# Patient Record
Sex: FEMALE | Race: WHITE | NOT HISPANIC OR LATINO | Employment: OTHER | URBAN - METROPOLITAN AREA
[De-identification: names, ages, dates, MRNs, and addresses within clinical notes are randomized per-mention and may not be internally consistent; named-entity substitution may affect disease eponyms.]

---

## 2017-08-04 ENCOUNTER — OFFICE VISIT (OUTPATIENT)
Dept: OBSTETRICS AND GYNECOLOGY | Facility: CLINIC | Age: 77
End: 2017-08-04

## 2017-08-04 VITALS
DIASTOLIC BLOOD PRESSURE: 80 MMHG | OXYGEN SATURATION: 96 % | HEIGHT: 62 IN | SYSTOLIC BLOOD PRESSURE: 124 MMHG | BODY MASS INDEX: 28.89 KG/M2 | HEART RATE: 72 BPM | WEIGHT: 157 LBS

## 2017-08-04 DIAGNOSIS — L98.9 SKIN LESION: ICD-10-CM

## 2017-08-04 DIAGNOSIS — L98.9 SKIN LESION: Primary | ICD-10-CM

## 2017-08-04 PROCEDURE — 56605 BIOPSY OF VULVA/PERINEUM: CPT | Performed by: NURSE PRACTITIONER

## 2017-08-04 PROCEDURE — 99203 OFFICE O/P NEW LOW 30 MIN: CPT | Performed by: NURSE PRACTITIONER

## 2017-08-04 RX ORDER — LEVOTHYROXINE SODIUM 0.05 MG/1
50 TABLET ORAL DAILY
COMMUNITY
Start: 2017-07-21 | End: 2022-05-17 | Stop reason: SDUPTHER

## 2017-08-04 RX ORDER — PRAVASTATIN SODIUM 20 MG
20 TABLET ORAL NIGHTLY
COMMUNITY
Start: 2017-07-21 | End: 2022-05-17 | Stop reason: SDUPTHER

## 2017-08-04 RX ORDER — ZOSTER VACCINE LIVE 19400 [PFU]/.65ML
INJECTION, POWDER, LYOPHILIZED, FOR SUSPENSION SUBCUTANEOUS
COMMUNITY
Start: 2017-07-12 | End: 2021-09-27

## 2017-08-04 RX ORDER — OMEPRAZOLE 20 MG/1
20 CAPSULE, DELAYED RELEASE ORAL DAILY
COMMUNITY
Start: 2017-06-16 | End: 2022-04-05 | Stop reason: SDUPTHER

## 2017-08-04 RX ORDER — AMLODIPINE BESYLATE 2.5 MG/1
TABLET ORAL
COMMUNITY
Start: 2017-07-21 | End: 2021-09-27 | Stop reason: DRUGHIGH

## 2017-08-07 ENCOUNTER — TELEPHONE (OUTPATIENT)
Dept: GYNECOLOGIC ONCOLOGY | Facility: CLINIC | Age: 77
End: 2017-08-07

## 2017-08-07 NOTE — TELEPHONE ENCOUNTER
Called patient to notify pathology from vulvar biopsy revealed benign hemangioma. She is thankful for this news and will notify us of any concerns in the future.

## 2017-08-08 NOTE — PROGRESS NOTES
"[facility] NEW PATIENT VISIT      Petty Artis  [id number]  1940      Chief Complaint: Establish Care (c/o vulvar lesion)        History of present illness:  Petty Artis is a 77 y.o. year old female, new to the practice, who is here today for evaluation of new vulvar lesion. She is a very pleasant  with a benign GYN history. She has never had an abnormal pap smear, last in  at age 65. She is involved in the [place] ovarian cancer screening program and all studies have been negative. She sees her PCP, Dr. Chloé Elena, regularly who manages her breast exams and routine health screenings.   She presents today with report of \"growth\" to right labia that she first noticed 3-4 months ago. She states the area is purple in color. She denies pain, bleeding, itching, or increase in size in the area. There is no drainage or swelling. She requests evaluation of the area and biopsy if needed.      Obstetric History:  OB History      Para Term  AB TAB SAB Ectopic Multiple Living    2 2 2       2           Menstrual History:     No LMP recorded. Patient is postmenopausal.          Past Medical History:   Diagnosis Date   • C. difficile colitis    • Disease of thyroid gland    • Hyperlipidemia        Past Surgical History:   Procedure Laterality Date   • THYROIDECTOMY, PARTIAL  2010       MEDICATIONS: The current medication list was reviewed and reconciled.     Allergies:  is allergic to cefadroxil and clindamycin/lincomycin.    Family History   Problem Relation Age of Onset   • Colon cancer Mother    • Ovarian cancer Maternal Aunt    • Colon cancer Maternal Aunt    • Uterine cancer Maternal Aunt        Health Maintenance: UTD through PCP office    Review of Systems   Constitutional: Negative for fatigue, fever and unexpected weight change.   HENT: Negative for congestion, ear pain, hearing loss, sinus pressure and trouble swallowing.    Eyes: Negative for visual disturbance.   Respiratory: " "Negative for cough, chest tightness, shortness of breath and wheezing.    Cardiovascular: Negative for chest pain, palpitations and leg swelling.   Gastrointestinal: Negative for abdominal distention, abdominal pain, constipation, diarrhea, nausea and vomiting.   Endocrine: Negative for cold intolerance, heat intolerance, polydipsia, polyphagia and polyuria.   Genitourinary: Positive for genital sores (\"growth\" to right labia x 3-4 months). Negative for difficulty urinating, dysuria, frequency, hematuria, pelvic pain, urgency, vaginal bleeding, vaginal discharge and vaginal pain.   Musculoskeletal: Negative for arthralgias, gait problem, joint swelling and myalgias.   Skin: Negative for color change, pallor and rash.   Neurological: Negative for dizziness, seizures, syncope, weakness, light-headedness, numbness and headaches.   Hematological: Negative for adenopathy. Does not bruise/bleed easily.   Psychiatric/Behavioral: Negative for agitation, confusion, sleep disturbance and suicidal ideas. The patient is not nervous/anxious.        Physical Exam  Vital Signs: /80  Pulse 72  Ht 61.5\" (156.2 cm)  Wt 157 lb (71.2 kg)  SpO2 96%  BMI 29.18 kg/m2   General Appearance:  alert, cooperative, no apparent distress and appears stated age   Neurologic/Psychiatric: A&O x 3, gait steady, appropriate affect   HEENT:  Normocephalic, without obvious abnormality, mucous membranes moist   Neck: Supple, symmetrical, trachea midline, no adenopathy;  No thyromegaly, masses, or tenderness   Back:   Symmetric, no curvature, ROM normal, no CVA tenderness   Lungs:   Clear to auscultation bilaterally; respirations regular, even, and unlabored bilaterally   Heart:  Regular rate and rhythm, no murmurs appreciated   Breasts:  deferred   Abdomen:   Soft, non-tender, non-distended and no organomegaly   Lymph nodes: No cervical, supraclavicular, inguinal or axillary adenopathy noted   Extremities: Normal, atraumatic; no clubbing, " cyanosis, or edema    Skin: No rashes, ulcers, or suspicious lesions noted   Pelvic: External Genitalia  atrophic with several small hemangioma noted to right vulva. Area of concern is approx .5 cm, purple in color, firm, and slightly raised. Borders are regular.  Vagina  is pale, atrophic.   Cervix  normal, without lesions, no discharge and no CMT  Uterus  normal size, midposition, mobile and nontender  Ovaries  without palpable masses or fullness  Parametria  smooth  Rectovaginal  Female rectovaginal: deferred       Procedure Note:  After discussion of procedure and obtaining consent a vulvar punch biopsy was performed. Lesion located upon middle right vulva, approx .5   cm in size. Site cleaned with betadine in the usual fashion. 1 mL lidocaine with epinephrine injected beneath lesion for local anesthesia. Punch biopsy performed and tissue sample cut away from underlying tissue. Specimen placed into appropriate container and labeled at bedside. Bleeding was Minimal. Silver nitrate used to achieve hemostasis. Patient tolerated the procedure well.       Assessment and Plan:    Petty was seen today for establish care.    Diagnoses and all orders for this visit:    Skin lesion  -     Tissue Pathology Exam; Future        Petty and I discussed that the lesion of concern does not appear as a dysplastic lesion. This is likely a larger hemangioma, but we agreed on a biopsy today to ensure no concerns. Post-procedure precautions given. I will notify her of pathology results once they are available. If any abnormalities, we will discuss treatments or referrals at that time. Overall, she was reassured today. Encouraged to call in 1 week if does not receive a call with results.     Return recurrent lesions or general GYN concerns.      ISIS Oliveira      Note: Speech recognition transcription software was used to dictate portions of this document.  An attempt at proofreading has been made though minor errors in  transcription may still be present.  Please do not hesitate to call our office with any questions.

## 2017-08-10 ENCOUNTER — TELEPHONE (OUTPATIENT)
Dept: OBSTETRICS AND GYNECOLOGY | Facility: CLINIC | Age: 77
End: 2017-08-10

## 2017-08-10 RX ORDER — MUPIROCIN CALCIUM 20 MG/G
CREAM TOPICAL 3 TIMES DAILY
Qty: 15 G | Refills: 1 | Status: SHIPPED | OUTPATIENT
Start: 2017-08-10 | End: 2017-08-11 | Stop reason: ALTCHOICE

## 2017-08-10 NOTE — TELEPHONE ENCOUNTER
----- Message from Terrie Norton sent at 8/10/2017 11:22 AM EDT -----  Spot from biopsy is not getting any better, can she use an antibiotic ointment ?

## 2017-08-10 NOTE — TELEPHONE ENCOUNTER
I will send some Bactroban to her pharmacy. If it is not better by next week, we probably need to see it in clinic. Thanks!

## 2017-08-11 ENCOUNTER — TELEPHONE (OUTPATIENT)
Dept: OBSTETRICS AND GYNECOLOGY | Facility: CLINIC | Age: 77
End: 2017-08-11

## 2017-08-11 NOTE — TELEPHONE ENCOUNTER
I have called pharmacy. Pharmacists states our office should have been called to offer ointment instead. This is usually covered better than the cream. I have changed the order. Pharmacists states he will call the patient to notify her of the change and new cost. Thanks!

## 2017-08-11 NOTE — TELEPHONE ENCOUNTER
----- Message from Cindy Cody sent at 8/11/2017  9:45 AM EDT -----  Patient is calling to say that the medicine that was called in yesterday was $140.00. Wants to know if there is something else.

## 2018-12-14 DIAGNOSIS — Z12.11 SCREENING FOR COLON CANCER: Primary | ICD-10-CM

## 2018-12-20 ENCOUNTER — LAB REQUISITION (OUTPATIENT)
Dept: LAB | Facility: HOSPITAL | Age: 78
End: 2018-12-20

## 2018-12-20 ENCOUNTER — OUTSIDE FACILITY SERVICE (OUTPATIENT)
Dept: GASTROENTEROLOGY | Facility: CLINIC | Age: 78
End: 2018-12-20

## 2018-12-20 DIAGNOSIS — Z86.010 HISTORY OF COLONIC POLYPS: ICD-10-CM

## 2018-12-20 DIAGNOSIS — Z12.11 ENCOUNTER FOR SCREENING FOR MALIGNANT NEOPLASM OF COLON: ICD-10-CM

## 2018-12-20 DIAGNOSIS — Z80.0 FAMILY HISTORY OF MALIGNANT NEOPLASM OF DIGESTIVE ORGAN: ICD-10-CM

## 2018-12-20 PROCEDURE — 88305 TISSUE EXAM BY PATHOLOGIST: CPT | Performed by: INTERNAL MEDICINE

## 2018-12-20 PROCEDURE — 45388 COLONOSCOPY W/ABLATION: CPT | Performed by: INTERNAL MEDICINE

## 2018-12-20 PROCEDURE — G0500 MOD SEDAT ENDO SERVICE >5YRS: HCPCS | Performed by: INTERNAL MEDICINE

## 2018-12-20 PROCEDURE — 45385 COLONOSCOPY W/LESION REMOVAL: CPT | Performed by: INTERNAL MEDICINE

## 2018-12-21 LAB
CYTO UR: NORMAL
LAB AP CASE REPORT: NORMAL
LAB AP CLINICAL INFORMATION: NORMAL
PATH REPORT.FINAL DX SPEC: NORMAL
PATH REPORT.GROSS SPEC: NORMAL

## 2021-09-01 ENCOUNTER — TELEPHONE (OUTPATIENT)
Dept: INTERNAL MEDICINE | Facility: CLINIC | Age: 81
End: 2021-09-01

## 2021-09-01 NOTE — TELEPHONE ENCOUNTER
Caller: Petty Artis    Relationship to patient: Self    Best call back number: [phone]    Type of visit: NEW PATIENT    Additional notes:  PETTY'S SPOUSE, PABLO ARTIS, IS A PATIENT OF DR GONZALEZ'S.  DR GONZALEZ SAID HE WOULD ACCEPT PETTY AS A NEW PATIENT.  PLEASE REACH OUT TO SCHEDULE.

## 2021-09-27 ENCOUNTER — OFFICE VISIT (OUTPATIENT)
Dept: INTERNAL MEDICINE | Facility: CLINIC | Age: 81
End: 2021-09-27

## 2021-09-27 ENCOUNTER — LAB (OUTPATIENT)
Dept: LAB | Facility: HOSPITAL | Age: 81
End: 2021-09-27

## 2021-09-27 VITALS
HEART RATE: 96 BPM | DIASTOLIC BLOOD PRESSURE: 80 MMHG | BODY MASS INDEX: 26.41 KG/M2 | SYSTOLIC BLOOD PRESSURE: 126 MMHG | RESPIRATION RATE: 18 BRPM | TEMPERATURE: 97.8 F | HEIGHT: 59 IN | WEIGHT: 131 LBS

## 2021-09-27 DIAGNOSIS — R82.998 LEUKOCYTES IN URINE: ICD-10-CM

## 2021-09-27 DIAGNOSIS — I10 ESSENTIAL HYPERTENSION: Primary | ICD-10-CM

## 2021-09-27 DIAGNOSIS — Z78.0 POSTMENOPAUSAL: ICD-10-CM

## 2021-09-27 DIAGNOSIS — E03.9 HYPOTHYROIDISM, UNSPECIFIED TYPE: ICD-10-CM

## 2021-09-27 DIAGNOSIS — E78.5 HYPERLIPIDEMIA, UNSPECIFIED HYPERLIPIDEMIA TYPE: ICD-10-CM

## 2021-09-27 DIAGNOSIS — M80.80XA LOCALIZED OSTEOPOROSIS WITH CURRENT PATHOLOGICAL FRACTURE, INITIAL ENCOUNTER: ICD-10-CM

## 2021-09-27 DIAGNOSIS — R82.998 LEUKOCYTES IN URINE: Primary | ICD-10-CM

## 2021-09-27 LAB
BILIRUB BLD-MCNC: NEGATIVE MG/DL
CLARITY, POC: CLEAR
COLOR UR: YELLOW
EXPIRATION DATE: ABNORMAL
GLUCOSE UR STRIP-MCNC: NEGATIVE MG/DL
KETONES UR QL: NEGATIVE
LEUKOCYTE EST, POC: ABNORMAL
Lab: ABNORMAL
NITRITE UR-MCNC: NEGATIVE MG/ML
PH UR: 6.5 [PH] (ref 5–8)
PROT UR STRIP-MCNC: NEGATIVE MG/DL
RBC # UR STRIP: NEGATIVE /UL
SP GR UR: 1 (ref 1–1.03)
UROBILINOGEN UR QL: NORMAL

## 2021-09-27 PROCEDURE — 99204 OFFICE O/P NEW MOD 45 MIN: CPT | Performed by: INTERNAL MEDICINE

## 2021-09-27 PROCEDURE — 36415 COLL VENOUS BLD VENIPUNCTURE: CPT | Performed by: INTERNAL MEDICINE

## 2021-09-27 PROCEDURE — 81003 URINALYSIS AUTO W/O SCOPE: CPT | Performed by: INTERNAL MEDICINE

## 2021-09-27 RX ORDER — MELATONIN 10 MG
CAPSULE ORAL NIGHTLY
COMMUNITY

## 2021-09-27 RX ORDER — ASPIRIN 81 MG/1
81 TABLET ORAL DAILY
COMMUNITY
End: 2022-08-16

## 2021-09-27 RX ORDER — AMLODIPINE BESYLATE 5 MG/1
1 TABLET ORAL DAILY
COMMUNITY
End: 2021-10-12 | Stop reason: SDUPTHER

## 2021-09-27 RX ORDER — MULTIPLE VITAMINS W/ MINERALS TAB 9MG-400MCG
1 TAB ORAL DAILY
COMMUNITY
End: 2023-01-19

## 2021-09-27 NOTE — PROGRESS NOTES
Chief Complaint   Patient presents with   • NEW PATIENT, ESTABLISH CARE       History of Present Illness      The patient presents for a follow-up related to hypertension. The patient reports that she has had no headaches, chest pain, dyspnea, edema, syncope, blurred vision or palpitations. She states that she is taking her medication as prescribed. She is not having medication side effects.    The patient presents for a follow-up related to hypothyroidism. She reports a good energy level. She reports no hair loss, heat intolerance, cold intolerance, diarrhea, constipation or sweats. She is taking her medication as prescribed.    The patient presents for a follow-up related to hyperlipidemia. She is following a low fat diet. She reports that she is exercising. She is taking pravastatin. The patient is taking her medication as prescribed. She reports no medication side effects, including muscle cramps, abdominal pain, headaches or weakness. She denies orthopnea, paroxysmal nocturnal dyspnea or dyspnea on exertion.    The patient presents for follow-up of osteoporosis. She reports no excessive caffeine intake. The patient does not smoke. The patient has been on medications in the past but not currently.  She takes adequate supplemental calcium.    Review of Systems    CONSTITUTIONAL- Denies Unexplained Weight Loss, Fever, Chills, Sweats, Weakness or Malaise.    NECK- Denies Decreased Range of Motion, Stiffness, Thyroid Nodules, Enlarged Lymph Nodes or Goiter.    EYES- Denies Eye Pain, Eye Drainage, Eye Redness, Eye Discharge, Decreased Vision, Visual Disturbance, Diplopia, Visual Loss or Swollen Eyelid.    HENT- Denies Nasal Discharge, Sore Throat, Ear Pain, Ear Drainage, Sinus Pain, Nasal Congestion, Decreased Hearing or Tinnitus.    PULMONARY- Denies Wheezing, Sputum Production, Cough, Hemoptysis or Pleuritic Chest Pain.    GASTROINTESTINAL- Denies Abdominal Pain, Nausea, Vomiting, Blood per Rectum or  "Heartburn.    CARDIOVASCULAR- Denies Claudication or Irregular Heart Beat.    GENITOURINARY- Denies Dysuria, Hematuria, Urinary Frequency, Urinary Leakage, Pelvic Pain, Vaginal Prolapse, Vaginal Discharge, Dyspareunia or Abnormal Menses.    ENDOCRINE- Denies Depression, Memory Loss, Polydypsia, Polyphagia, Polyuria, Sleep Disturbance or Weight Gain.    NEUROLOGIC- Denies Seizures, Confusion or Excessive Daytime Sleepiness.    MUSCULOSKELETAL- Denies Joint Pain, Joint Stiffness, Decreased Range of Motion, Joint Swelling or Erythema of Joints.    INTEGUMENTARY- Denies Rash, Lumps, Sores, Itching, Dryness, Color Change, Changes in Hair, Brittle Nails, Discolored Nails, Thickened Nails, Growths or Alopecia.    Medications      Current Outpatient Medications:   •  amLODIPine (NORVASC) 5 MG tablet, Take 1 tablet by mouth Daily., Disp: , Rfl:   •  aspirin (ASPIR) 81 MG EC tablet, Take 81 mg by mouth Daily., Disp: , Rfl:   •  levothyroxine (SYNTHROID, LEVOTHROID) 50 MCG tablet, Take 50 mcg by mouth Daily., Disp: , Rfl:   •  Melatonin 10 MG capsule, Take  by mouth Every Night., Disp: , Rfl:   •  multivitamin with minerals (CENTRUM SILVER PO), Take 1 tablet by mouth Daily., Disp: , Rfl:   •  omeprazole (priLOSEC) 20 MG capsule, Take 20 mg by mouth Daily., Disp: , Rfl:   •  pravastatin (PRAVACHOL) 20 MG tablet, Take 20 mg by mouth Every Night., Disp: , Rfl:   •  Probiotic Product (PROBIOTIC ADVANCED PO), Take  by mouth Daily., Disp: , Rfl:      Allergies    Allergies   Allergen Reactions   • Cefadroxil Other (See Comments)     C-DIFF   • Clindamycin/Lincomycin Other (See Comments)     C-DIFF       Problem List    There is no problem list on file for this patient.      Medications, Allergies, Problems List and Past History were reviewed and updated.    Physical Examination    /80   Pulse 96   Temp 97.8 °F (36.6 °C) (Infrared)   Resp 18   Ht 149.2 cm (58.75\")   Wt 59.4 kg (131 lb)   LMP  (LMP Unknown)   " Breastfeeding No   BMI 26.68 kg/m²       HEENT: Head- Normocephalic Atraumatic. Facies- Within normal limits. Pinnas- Normal texture and shape bilaterally. Canals- Normal bilaterally. TMs- Normal bilaterally. Nares- Patent bilaterally. Nasal Septum- is normal. There is no tenderness to palpation over the frontal or maxillary sinuses. Lids- Normal bilaterally. Conjunctiva- Clear bilaterally. Sclera- Anicteric bilaterally. Oropharynx- Moist with no lesions. Tonsils- No enlargement, erythema or exudate.    Neck: Thyroid- non enlarged, symmetric and has no nodules. No bruits are detected. ROM- Normal Range of Motion with no rigidity.    Lungs: Auscultation- Clear to auscultation bilaterally. There are no retractions, clubbing or cyanosis. The Expiratory to Inspiratory ratio is equal.    Lymph Nodes: Cervical- no enlarged lymph nodes noted. Clavicular- no enlarged supraclavicular lymph nodes noted. Axillary- no enlarged axillary lymph nodes noted. Inguinal- no enlarged inguinal lymph nodes noted.    Cardiovascular: There are no carotid bruits. Heart- Normal Rate with Regular rhythm and no murmurs. There are no gallops. There are no rubs. In the lower extremities there is no edema. The upper extremities do not have edema.    Abdomen: Soft, benign, non-tender with no masses, hernias, organomegaly or scars.    Breast: Normal contours bilaterally with no masses, discharge, skin changes or lumps. There are no scars noted.    Impression and Assessment    Essential Hypertension.    Hypothyroidism.    Hyperlipidemia.    Osteoporosis.    Plan    Essential Hypertension Plan: The patient was instructed to continue the current medications.    Hyperlipidemia Plan: The patient was instructed to exercise daily, eat a low fat diet and continue her medications.    Hypothyroidism Plan: The patient was instructed to continue the current medications.    Osteoporosis Plan: Further plans will be formulated after test results are  reviewed.    Diagnoses and all orders for this visit:    1. Essential hypertension (Primary)  -     CBC & Differential; Future  -     Comprehensive Metabolic Panel; Future  -     POC Urinalysis Dipstick, Automated    2. Hypothyroidism, unspecified type  -     TSH; Future  -     T4, Free; Future    3. Hyperlipidemia, unspecified hyperlipidemia type  -     Comprehensive Metabolic Panel; Future  -     Lipid Panel; Future    4. Localized osteoporosis with current pathological fracture, initial encounter  -     Comprehensive Metabolic Panel; Future  -     DEXA Bone Density Axial; Future    5. Postmenopausal  -     DEXA Bone Density Axial; Future        Return to Office    The patient was instructed to return for follow-up in 4 months. The patient was instructed to return sooner if the condition changes, worsens, or does not resolve.

## 2021-09-28 LAB
ALBUMIN SERPL-MCNC: 4.6 G/DL (ref 3.5–5.2)
ALBUMIN/GLOB SERPL: 1.5 G/DL
ALP SERPL-CCNC: 292 U/L (ref 39–117)
ALT SERPL-CCNC: 87 U/L (ref 1–33)
AST SERPL-CCNC: 84 U/L (ref 1–32)
BASOPHILS # BLD AUTO: 0.03 10*3/MM3 (ref 0–0.2)
BASOPHILS NFR BLD AUTO: 0.5 % (ref 0–1.5)
BILIRUB SERPL-MCNC: 0.7 MG/DL (ref 0–1.2)
BUN SERPL-MCNC: 8 MG/DL (ref 8–23)
BUN/CREAT SERPL: 12.5 (ref 7–25)
CALCIUM SERPL-MCNC: 10 MG/DL (ref 8.6–10.5)
CHLORIDE SERPL-SCNC: 98 MMOL/L (ref 98–107)
CHOLEST SERPL-MCNC: 209 MG/DL (ref 0–200)
CO2 SERPL-SCNC: 26 MMOL/L (ref 22–29)
CREAT SERPL-MCNC: 0.64 MG/DL (ref 0.57–1)
EOSINOPHIL # BLD AUTO: 0.17 10*3/MM3 (ref 0–0.4)
EOSINOPHIL NFR BLD AUTO: 3.1 % (ref 0.3–6.2)
ERYTHROCYTE [DISTWIDTH] IN BLOOD BY AUTOMATED COUNT: 11.8 % (ref 12.3–15.4)
GLOBULIN SER CALC-MCNC: 3 GM/DL
GLUCOSE SERPL-MCNC: 119 MG/DL (ref 65–99)
HCT VFR BLD AUTO: 39.5 % (ref 34–46.6)
HDLC SERPL-MCNC: 100 MG/DL (ref 40–60)
HGB BLD-MCNC: 13 G/DL (ref 12–15.9)
IMM GRANULOCYTES # BLD AUTO: 0.01 10*3/MM3 (ref 0–0.05)
IMM GRANULOCYTES NFR BLD AUTO: 0.2 % (ref 0–0.5)
LDLC SERPL CALC-MCNC: 96 MG/DL (ref 0–100)
LYMPHOCYTES # BLD AUTO: 1.68 10*3/MM3 (ref 0.7–3.1)
LYMPHOCYTES NFR BLD AUTO: 30.2 % (ref 19.6–45.3)
MCH RBC QN AUTO: 31.2 PG (ref 26.6–33)
MCHC RBC AUTO-ENTMCNC: 32.9 G/DL (ref 31.5–35.7)
MCV RBC AUTO: 94.7 FL (ref 79–97)
MONOCYTES # BLD AUTO: 0.42 10*3/MM3 (ref 0.1–0.9)
MONOCYTES NFR BLD AUTO: 7.6 % (ref 5–12)
NEUTROPHILS # BLD AUTO: 3.25 10*3/MM3 (ref 1.7–7)
NEUTROPHILS NFR BLD AUTO: 58.4 % (ref 42.7–76)
NRBC BLD AUTO-RTO: 0 /100 WBC (ref 0–0.2)
PLATELET # BLD AUTO: 236 10*3/MM3 (ref 140–450)
POTASSIUM SERPL-SCNC: 5.9 MMOL/L (ref 3.5–5.2)
PROT SERPL-MCNC: 7.6 G/DL (ref 6–8.5)
RBC # BLD AUTO: 4.17 10*6/MM3 (ref 3.77–5.28)
SODIUM SERPL-SCNC: 139 MMOL/L (ref 136–145)
T4 FREE SERPL-MCNC: 1.38 NG/DL (ref 0.93–1.7)
TRIGL SERPL-MCNC: 74 MG/DL (ref 0–150)
TSH SERPL DL<=0.005 MIU/L-ACNC: 3.92 UIU/ML (ref 0.27–4.2)
VLDLC SERPL CALC-MCNC: 13 MG/DL (ref 5–40)
WBC # BLD AUTO: 5.56 10*3/MM3 (ref 3.4–10.8)

## 2021-10-01 ENCOUNTER — PATIENT ROUNDING (BHMG ONLY) (OUTPATIENT)
Dept: INTERNAL MEDICINE | Facility: CLINIC | Age: 81
End: 2021-10-01

## 2021-10-01 LAB
BACTERIA UR CULT: ABNORMAL
BACTERIA UR CULT: ABNORMAL
OTHER ANTIBIOTIC SUSC ISLT: ABNORMAL

## 2021-10-01 NOTE — PROGRESS NOTES
Petty Yanes.  My name is Kayla, and I am calling from [facility] in regards to your recent visit with us. I wanted to welcome you to the practice and ensure that your first visit went smoothly.  If you have any questions or concerns, feel free to reach out to me.  Thank you, and have a great day.

## 2021-10-11 ENCOUNTER — TELEPHONE (OUTPATIENT)
Dept: INTERNAL MEDICINE | Facility: CLINIC | Age: 81
End: 2021-10-11

## 2021-10-11 NOTE — TELEPHONE ENCOUNTER
Caller: Petty Artis    Relationship to patient: Self    Best call back number:[phone]     What is the call regarding:  PATIENT STATES THAT SHE WOULD LIKE TO GO BACK ON THE AMLODIPINE 2.5 MG.  HER PHARMACY IS [facility].

## 2021-10-11 NOTE — TELEPHONE ENCOUNTER
S/W pt, states once she increased her Amlodipine to 5mg her feet started to swell and are very swollen by end of day.  Sates about a week ago she went back to the 2.5 mg tablets she had and her feet are not swelling now.  States she feels she needs to be on the 2.5 mg instead.  States she has a home B/P monitor and that her B/P readings have been about the same on either does. States she has few days of the 2.5 mg left.  Expl will forward message to Dr Shahid and let her know what he says.  Verb apprec.

## 2021-10-12 RX ORDER — AMLODIPINE BESYLATE 2.5 MG/1
2.5 TABLET ORAL DAILY
Qty: 90 TABLET | Refills: 1 | Status: SHIPPED | OUTPATIENT
Start: 2021-10-12 | End: 2022-02-21

## 2021-10-12 NOTE — TELEPHONE ENCOUNTER
S/W pt, expl that Dr Shahid said it was okay to decrease to the 2.5 mg daily and that we will send rx to her [facility] pharmacy as she requested.  Verb understanding and great apprec.     Rx sent via erx.

## 2021-10-12 NOTE — TELEPHONE ENCOUNTER
OK  If she needs a refill ok to call in.  Please call in 6 month supply (Either 1 month with RF 5 or 3 months with RF 1).

## 2021-12-07 ENCOUNTER — OFFICE VISIT (OUTPATIENT)
Dept: INTERNAL MEDICINE | Facility: CLINIC | Age: 81
End: 2021-12-07

## 2021-12-07 ENCOUNTER — LAB (OUTPATIENT)
Dept: LAB | Facility: HOSPITAL | Age: 81
End: 2021-12-07

## 2021-12-07 VITALS
WEIGHT: 129.6 LBS | DIASTOLIC BLOOD PRESSURE: 82 MMHG | HEIGHT: 59 IN | RESPIRATION RATE: 18 BRPM | HEART RATE: 85 BPM | OXYGEN SATURATION: 97 % | SYSTOLIC BLOOD PRESSURE: 130 MMHG | TEMPERATURE: 97.7 F | BODY MASS INDEX: 26.13 KG/M2

## 2021-12-07 DIAGNOSIS — R79.89 ELEVATED LFTS: ICD-10-CM

## 2021-12-07 DIAGNOSIS — I10 ESSENTIAL HYPERTENSION: Primary | ICD-10-CM

## 2021-12-07 PROCEDURE — 99213 OFFICE O/P EST LOW 20 MIN: CPT | Performed by: INTERNAL MEDICINE

## 2021-12-07 PROCEDURE — 36415 COLL VENOUS BLD VENIPUNCTURE: CPT | Performed by: INTERNAL MEDICINE

## 2021-12-07 RX ORDER — OXYCODONE AND ACETAMINOPHEN 7.5; 325 MG/1; MG/1
TABLET ORAL
COMMUNITY
Start: 2021-11-15 | End: 2022-05-16

## 2021-12-07 NOTE — PROGRESS NOTES
Chief Complaint   Patient presents with   • Hypothyroidism   • Hypertension   • Hyperlipidemia       History of Present Illness      The patient presents for a follow-up related to hypertension. The patient reports that she has had no headaches, chest pain, dyspnea, edema, syncope, blurred vision or palpitations. She states that she is taking her medication as prescribed. She is not having medication side effects.    The patient reports that as relates to the elevated LFT's, she has no nausea, jaundice, itching or abdominal pain. She denies hepatotoxic medications.    Review of Systems    CONSTITUTIONAL- Denies Unexplained Weight Loss, Fever, Chills, Sweats, Fatigue, Weakness or Malaise.    GASTROINTESTINAL- Denies Vomiting, Diarrhea, Blood per Rectum, Constipation or Heartburn.    PULMONARY- Denies Wheezing, Sputum Production, Cough, Hemoptysis or Pleuritic Chest Pain.    CARDIOVASCULAR- Denies Claudication or Irregular Heart Beat.    Medications      Current Outpatient Medications:   •  amLODIPine (NORVASC) 2.5 MG tablet, Take 1 tablet by mouth Daily., Disp: 90 tablet, Rfl: 1  •  aspirin (ASPIR) 81 MG EC tablet, Take 81 mg by mouth Daily., Disp: , Rfl:   •  levothyroxine (SYNTHROID, LEVOTHROID) 50 MCG tablet, Take 50 mcg by mouth Daily., Disp: , Rfl:   •  Melatonin 10 MG capsule, Take  by mouth Every Night., Disp: , Rfl:   •  multivitamin with minerals (CENTRUM SILVER PO), Take 1 tablet by mouth Daily., Disp: , Rfl:   •  omeprazole (priLOSEC) 20 MG capsule, Take 20 mg by mouth Daily., Disp: , Rfl:   •  oxyCODONE-acetaminophen (PERCOCET) 7.5-325 MG per tablet, , Disp: , Rfl:   •  pravastatin (PRAVACHOL) 20 MG tablet, Take 20 mg by mouth Every Night., Disp: , Rfl:   •  Probiotic Product (PROBIOTIC ADVANCED PO), Take  by mouth Daily., Disp: , Rfl:      Allergies    Allergies   Allergen Reactions   • Cefadroxil Other (See Comments)     C-DIFF   • Clindamycin/Lincomycin Other (See Comments)     C-DIFF       Problem  "List    There is no problem list on file for this patient.      Medications, Allergies, Problems List and Past History were reviewed and updated.    Physical Examination    /82   Pulse 85   Temp 97.7 °F (36.5 °C) (Infrared)   Resp 18   Ht 149.2 cm (58.75\")   Wt 58.8 kg (129 lb 9.6 oz)   LMP  (LMP Unknown)   SpO2 97%   BMI 26.40 kg/m²     HEENT: Facies- Within normal limits. Lids- Normal bilaterally. Conjunctiva- Clear bilaterally. Sclera- Anicteric bilaterally.    Neck: Thyroid- non enlarged, symmetric and has no nodules. No bruits are detected.    Lungs: Auscultation- Clear to auscultation bilaterally. There are no retractions, clubbing or cyanosis. The Expiratory to Inspiratory ratio is equal.    Cardiovascular: There are no carotid bruits. Heart- Normal Rate with Regular rhythm and no murmurs. There are no gallops. There are no rubs. In the lower extremities there is no edema. The upper extremities do not have edema.    Abdomen: Soft, benign, non-tender with no masses, hernias, organomegaly or scars.    Impression and Assessment    Essential Hypertension.    Elevated LFTs.    Plan    Elevated LFTs Plan: Further plans will be made after the tests are reviewed.    Essential Hypertension Plan: The patient was instructed to continue the current medications.    Diagnoses and all orders for this visit:    1. Essential hypertension (Primary)    2. Elevated LFTs  -     Alpha - 1 - Antitrypsin; Future  -     Anti-Smooth Muscle Antibody Titer; Future  -     Ceruloplasmin; Future  -     Ferritin; Future  -     Hepatic Function Panel; Future  -     JELLY + PE; Future  -     Iron Profile; Future  -     Mitochondrial Antibodies, M2; Future  -     Nuclear Antigen Antibody, IFA; Future  -     Sedimentation Rate; Future        Return to Office    The patient was instructed to return for follow-up in 4 months. The patient was instructed to return sooner if the condition changes, worsens, or does not resolve.  "

## 2021-12-09 LAB
A1AT SERPL-MCNC: 166 MG/DL (ref 101–187)
ACTIN IGG SERPL-ACNC: 38 UNITS (ref 0–19)
ALBUMIN SERPL ELPH-MCNC: 3.8 G/DL (ref 2.9–4.4)
ALBUMIN SERPL-MCNC: 4.1 G/DL (ref 3.5–5.2)
ALBUMIN/GLOB SERPL: 1 {RATIO} (ref 0.7–1.7)
ALP SERPL-CCNC: 327 U/L (ref 39–117)
ALPHA1 GLOB SERPL ELPH-MCNC: 0.4 G/DL (ref 0–0.4)
ALPHA2 GLOB SERPL ELPH-MCNC: 0.8 G/DL (ref 0.4–1)
ALT SERPL-CCNC: 80 U/L (ref 1–33)
ANA TITR SER IF: NEGATIVE {TITER}
AST SERPL-CCNC: 91 U/L (ref 1–32)
B-GLOBULIN SERPL ELPH-MCNC: 1.1 G/DL (ref 0.7–1.3)
BILIRUB DIRECT SERPL-MCNC: 0.4 MG/DL (ref 0–0.3)
BILIRUB SERPL-MCNC: 0.6 MG/DL (ref 0–1.2)
CERULOPLASMIN SERPL-MCNC: 34.8 MG/DL (ref 19–39)
ERYTHROCYTE [SEDIMENTATION RATE] IN BLOOD BY WESTERGREN METHOD: 56 MM/HR (ref 0–30)
FERRITIN SERPL-MCNC: 57.8 NG/ML (ref 13–150)
GAMMA GLOB SERPL ELPH-MCNC: 1.9 G/DL (ref 0.4–1.8)
GLOBULIN SER-MCNC: 4.2 G/DL (ref 2.2–3.9)
IGA SERPL-MCNC: 316 MG/DL (ref 64–422)
IGG SERPL-MCNC: 1415 MG/DL (ref 586–1602)
IGM SERPL-MCNC: 669 MG/DL (ref 26–217)
INTERPRETATION SERPL IEP-IMP: ABNORMAL
IRON SATN MFR SERPL: 13 % (ref 20–50)
IRON SERPL-MCNC: 62 MCG/DL (ref 37–145)
LABORATORY COMMENT REPORT: ABNORMAL
M PROTEIN SERPL ELPH-MCNC: ABNORMAL G/DL
MITOCHONDRIA M2 IGG SER-ACNC: 177.9 UNITS (ref 0–20)
PROT SERPL-MCNC: 8 G/DL (ref 6–8.5)
TIBC SERPL-MCNC: 464 MCG/DL
UIBC SERPL-MCNC: 402 MCG/DL (ref 112–346)

## 2021-12-27 ENCOUNTER — APPOINTMENT (OUTPATIENT)
Dept: BONE DENSITY | Facility: HOSPITAL | Age: 81
End: 2021-12-27

## 2022-02-21 RX ORDER — AMLODIPINE BESYLATE 2.5 MG/1
TABLET ORAL
Qty: 90 TABLET | Refills: 0 | Status: SHIPPED | OUTPATIENT
Start: 2022-02-21 | End: 2022-05-17 | Stop reason: SDUPTHER

## 2022-02-21 NOTE — TELEPHONE ENCOUNTER
Rx Refill Note  Requested Prescriptions     Pending Prescriptions Disp Refills   • amLODIPine (NORVASC) 2.5 MG tablet [Pharmacy Med Name: AMLODIPINE BESYLATE 2.5 MG Tablet] 90 tablet 1     Sig: TAKE 1 TABLET EVERY DAY      Last office visit with prescribing clinician: 12/7/2021      Next office visit with prescribing clinician: 5/16/2022   }  Radha Spence MA  02/21/22, 15:32 EST     Last fill: 10/12/2021

## 2022-04-05 ENCOUNTER — TELEPHONE (OUTPATIENT)
Dept: INTERNAL MEDICINE | Facility: CLINIC | Age: 82
End: 2022-04-05

## 2022-04-05 RX ORDER — OMEPRAZOLE 20 MG/1
20 CAPSULE, DELAYED RELEASE ORAL DAILY
Qty: 90 CAPSULE | Refills: 1 | Status: SHIPPED | OUTPATIENT
Start: 2022-04-05 | End: 2022-10-10

## 2022-04-05 NOTE — TELEPHONE ENCOUNTER
Caller: Chandra Petty MEHRAN    Relationship: Self    Best call back number: [phone]    Requested Prescriptions:   Requested Prescriptions     Pending Prescriptions Disp Refills   • omeprazole (priLOSEC) 20 MG capsule       Sig: Take 1 capsule by mouth Daily.        Pharmacy where request should be sent: [facility] - [facility] [address] - [phone]  - [phone] FX     Does the patient have less than a 3 day supply:  [] Yes  [x] No    Ranjan Larsen Rep   04/05/22 13:04 EDT

## 2022-04-11 NOTE — TELEPHONE ENCOUNTER
S/W pt, states Dr Blue ordered the MRI because she has had so many compound fractures in her back, states the MRI showed that she had a small mass on the right side of her esophagus and said to follow up with additional testing.  States when she did not get a call back last week that she called Dr Fisher, a gastroenterologist that she had seen before and they scheduled her an appt for 4/26. Expl to definitely keep that appt and I will pass this info back to Dr Shahid.  States she will have her  drop off a copy of her MRI report for her chart.

## 2022-05-16 ENCOUNTER — LAB (OUTPATIENT)
Dept: LAB | Facility: HOSPITAL | Age: 82
End: 2022-05-16

## 2022-05-16 ENCOUNTER — OFFICE VISIT (OUTPATIENT)
Dept: INTERNAL MEDICINE | Facility: CLINIC | Age: 82
End: 2022-05-16

## 2022-05-16 VITALS
WEIGHT: 126.6 LBS | DIASTOLIC BLOOD PRESSURE: 68 MMHG | HEART RATE: 102 BPM | RESPIRATION RATE: 18 BRPM | OXYGEN SATURATION: 97 % | TEMPERATURE: 97.8 F | SYSTOLIC BLOOD PRESSURE: 132 MMHG | BODY MASS INDEX: 25.79 KG/M2

## 2022-05-16 DIAGNOSIS — I10 ESSENTIAL HYPERTENSION: Primary | ICD-10-CM

## 2022-05-16 DIAGNOSIS — E78.5 HYPERLIPIDEMIA, UNSPECIFIED HYPERLIPIDEMIA TYPE: ICD-10-CM

## 2022-05-16 DIAGNOSIS — E03.9 HYPOTHYROIDISM, UNSPECIFIED TYPE: ICD-10-CM

## 2022-05-16 PROCEDURE — 99214 OFFICE O/P EST MOD 30 MIN: CPT | Performed by: INTERNAL MEDICINE

## 2022-05-16 RX ORDER — CONJUGATED ESTROGENS 0.62 MG/G
CREAM VAGINAL
COMMUNITY
Start: 2022-03-31 | End: 2022-08-16

## 2022-05-16 RX ORDER — IBUPROFEN 200 MG
200 TABLET ORAL EVERY 6 HOURS PRN
COMMUNITY
End: 2022-08-16

## 2022-05-16 NOTE — PROGRESS NOTES
Chief Complaint   Patient presents with   • Hyperlipidemia     5 month f/u   • Hypertension     5 month f/u   • Hypothyroidism     5 month f/u       History of Present Illness    The patient presents for a follow-up related to hypertension. The patient reports that she has had no headaches, chest pain, dyspnea, edema, syncope, blurred vision or palpitations. She states that she is taking her medication as prescribed. She is not having medication side effects.    The patient presents for a follow-up related to hyperlipidemia. She is following a low fat diet. She reports that she is exercising. She is taking pravastatin. The patient is taking her medication as prescribed. She reports no medication side effects, including muscle cramps, abdominal pain, headaches or weakness. She denies orthopnea, paroxysmal nocturnal dyspnea or dyspnea on exertion.    The patient presents for a follow-up related to hypothyroidism. She reports a good energy level. She reports no hair loss, heat intolerance, cold intolerance, diarrhea, constipation or sweats. She is taking her medication as prescribed.    Review of Systems    CONSTITUTIONAL- Denies Unexplained Weight Loss, Fever, Chills, Sweats, Weakness or Malaise.    HENT- Denies Nasal Discharge, Sore Throat, Ear Pain, Ear Drainage, Sinus Pain, Nasal Congestion, Decreased Hearing or Tinnitus.    GASTROINTESTINAL- Denies Abdominal Pain, Nausea, Vomiting, Blood per Rectum or Heartburn.    PULMONARY- Denies Wheezing, Sputum Production, Cough, Hemoptysis or Pleuritic Chest Pain.    CARDIOVASCULAR- Denies Claudication or Irregular Heart Beat.    Medications      Current Outpatient Medications:   •  amLODIPine (NORVASC) 2.5 MG tablet, TAKE 1 TABLET EVERY DAY, Disp: 90 tablet, Rfl: 0  •  ibuprofen (ADVIL,MOTRIN) 200 MG tablet, Take 200 mg by mouth Every 6 (Six) Hours As Needed for Mild Pain ., Disp: , Rfl:   •  levothyroxine (SYNTHROID, LEVOTHROID) 50 MCG tablet, Take 50 mcg by mouth Daily.,  Disp: , Rfl:   •  Melatonin 10 MG capsule, Take  by mouth Every Night., Disp: , Rfl:   •  multivitamin with minerals tablet tablet, Take 1 tablet by mouth Daily., Disp: , Rfl:   •  omeprazole (priLOSEC) 20 MG capsule, Take 1 capsule by mouth Daily., Disp: 90 capsule, Rfl: 1  •  pravastatin (PRAVACHOL) 20 MG tablet, Take 20 mg by mouth Every Night., Disp: , Rfl:   •  Premarin 0.625 MG/GM vaginal cream, APPLY TOPICALLY TO VAGINAL AREA DAILY FOR 2 WEEKS AND AS NEEDED, Disp: , Rfl:   •  Probiotic Product (PROBIOTIC ADVANCED PO), Take  by mouth Daily., Disp: , Rfl:   •  aspirin (aspirin) 81 MG EC tablet, Take 81 mg by mouth Daily., Disp: , Rfl:      Allergies    Allergies   Allergen Reactions   • Cefadroxil Other (See Comments)     C-DIFF   • Clindamycin/Lincomycin Other (See Comments)     C-DIFF       Problem List    There is no problem list on file for this patient.      Medications, Allergies, Problems List and Past History were reviewed and updated.    Physical Examination    /68   Pulse 102   Temp 97.8 °F (36.6 °C) (Infrared)   Resp 18   Wt 57.4 kg (126 lb 9.6 oz)   LMP  (LMP Unknown)   SpO2 97%   BMI 25.79 kg/m²     HEENT: Facies- Within normal limits. Lids- Normal bilaterally. Conjunctiva- Clear bilaterally. Sclera- Anicteric bilaterally.    Neck: Thyroid- non enlarged, symmetric and has no nodules. No bruits are detected.    Lungs: Auscultation- Clear to auscultation bilaterally. There are no retractions, clubbing or cyanosis. The Expiratory to Inspiratory ratio is equal.    Cardiovascular: There are no carotid bruits. Heart- Normal Rate with Regular rhythm and no murmurs. There are no gallops. There are no rubs. In the lower extremities there is no edema. The upper extremities do not have edema.    Abdomen: Soft, benign, non-tender with no masses, hernias, organomegaly or scars.    Impression and Assessment    Essential Hypertension.    Hyperlipidemia.    Hypothyroidism.    Plan    Essential  Hypertension Plan: The patient was instructed to continue the current medications.    Hyperlipidemia Plan: The patient was instructed to exercise daily, eat a low fat diet and continue her medications.    Hypothyroidism Plan: The patient was instructed to continue the current medications.    Diagnoses and all orders for this visit:    1. Essential hypertension (Primary)  -     Comprehensive Metabolic Panel; Future  -     CBC & Differential; Future  -     CBC & Differential  -     Comprehensive Metabolic Panel    2. Hyperlipidemia, unspecified hyperlipidemia type  -     Comprehensive Metabolic Panel; Future  -     Comprehensive Metabolic Panel    3. Hypothyroidism, unspecified type  -     TSH; Future  -     T4, Free; Future  -     T4, Free  -     TSH        Return to Office    The patient was instructed to return for follow-up in 3 months. The patient was instructed to return sooner if the condition changes, worsens, or does not resolve.

## 2022-05-17 LAB
ALBUMIN SERPL-MCNC: 4.2 G/DL (ref 3.5–5.2)
ALBUMIN/GLOB SERPL: 1.2 G/DL
ALP SERPL-CCNC: 131 U/L (ref 39–117)
ALT SERPL-CCNC: 21 U/L (ref 1–33)
AST SERPL-CCNC: 31 U/L (ref 1–32)
BASOPHILS # BLD AUTO: 0.02 10*3/MM3 (ref 0–0.2)
BASOPHILS NFR BLD AUTO: 0.4 % (ref 0–1.5)
BILIRUB SERPL-MCNC: 0.4 MG/DL (ref 0–1.2)
BUN SERPL-MCNC: 8 MG/DL (ref 8–23)
BUN/CREAT SERPL: 13.6 (ref 7–25)
CALCIUM SERPL-MCNC: 9.9 MG/DL (ref 8.6–10.5)
CHLORIDE SERPL-SCNC: 98 MMOL/L (ref 98–107)
CO2 SERPL-SCNC: 22.7 MMOL/L (ref 22–29)
CREAT SERPL-MCNC: 0.59 MG/DL (ref 0.57–1)
EGFRCR SERPLBLD CKD-EPI 2021: 90.7 ML/MIN/1.73
EOSINOPHIL # BLD AUTO: 0.11 10*3/MM3 (ref 0–0.4)
EOSINOPHIL NFR BLD AUTO: 2.4 % (ref 0.3–6.2)
ERYTHROCYTE [DISTWIDTH] IN BLOOD BY AUTOMATED COUNT: 11.5 % (ref 12.3–15.4)
GLOBULIN SER CALC-MCNC: 3.5 GM/DL
GLUCOSE SERPL-MCNC: 149 MG/DL (ref 65–99)
HCT VFR BLD AUTO: 41.1 % (ref 34–46.6)
HGB BLD-MCNC: 13.9 G/DL (ref 12–15.9)
IMM GRANULOCYTES # BLD AUTO: 0.01 10*3/MM3 (ref 0–0.05)
IMM GRANULOCYTES NFR BLD AUTO: 0.2 % (ref 0–0.5)
LYMPHOCYTES # BLD AUTO: 1.49 10*3/MM3 (ref 0.7–3.1)
LYMPHOCYTES NFR BLD AUTO: 33 % (ref 19.6–45.3)
MCH RBC QN AUTO: 31.6 PG (ref 26.6–33)
MCHC RBC AUTO-ENTMCNC: 33.8 G/DL (ref 31.5–35.7)
MCV RBC AUTO: 93.4 FL (ref 79–97)
MONOCYTES # BLD AUTO: 0.35 10*3/MM3 (ref 0.1–0.9)
MONOCYTES NFR BLD AUTO: 7.7 % (ref 5–12)
NEUTROPHILS # BLD AUTO: 2.54 10*3/MM3 (ref 1.7–7)
NEUTROPHILS NFR BLD AUTO: 56.3 % (ref 42.7–76)
NRBC BLD AUTO-RTO: 0.2 /100 WBC (ref 0–0.2)
PLATELET # BLD AUTO: 262 10*3/MM3 (ref 140–450)
POTASSIUM SERPL-SCNC: 4.1 MMOL/L (ref 3.5–5.2)
PROT SERPL-MCNC: 7.7 G/DL (ref 6–8.5)
RBC # BLD AUTO: 4.4 10*6/MM3 (ref 3.77–5.28)
SODIUM SERPL-SCNC: 136 MMOL/L (ref 136–145)
T4 FREE SERPL-MCNC: 1.28 NG/DL (ref 0.93–1.7)
TSH SERPL DL<=0.005 MIU/L-ACNC: 6.27 UIU/ML (ref 0.27–4.2)
WBC # BLD AUTO: 4.52 10*3/MM3 (ref 3.4–10.8)

## 2022-05-17 RX ORDER — AMLODIPINE BESYLATE 2.5 MG/1
2.5 TABLET ORAL DAILY
Qty: 90 TABLET | Refills: 1 | Status: SHIPPED | OUTPATIENT
Start: 2022-05-17 | End: 2022-10-10

## 2022-05-17 RX ORDER — LEVOTHYROXINE SODIUM 0.07 MG/1
75 TABLET ORAL DAILY
Qty: 90 TABLET | Refills: 1 | Status: SHIPPED | OUTPATIENT
Start: 2022-05-17 | End: 2022-10-10

## 2022-05-17 RX ORDER — PRAVASTATIN SODIUM 20 MG
20 TABLET ORAL NIGHTLY
Qty: 90 TABLET | Refills: 1 | Status: SHIPPED | OUTPATIENT
Start: 2022-05-17 | End: 2022-10-10

## 2022-08-16 ENCOUNTER — OFFICE VISIT (OUTPATIENT)
Dept: INTERNAL MEDICINE | Facility: CLINIC | Age: 82
End: 2022-08-16

## 2022-08-16 ENCOUNTER — LAB (OUTPATIENT)
Dept: LAB | Facility: HOSPITAL | Age: 82
End: 2022-08-16

## 2022-08-16 VITALS
DIASTOLIC BLOOD PRESSURE: 80 MMHG | WEIGHT: 124.2 LBS | BODY MASS INDEX: 25.3 KG/M2 | TEMPERATURE: 98 F | SYSTOLIC BLOOD PRESSURE: 138 MMHG | HEART RATE: 84 BPM | RESPIRATION RATE: 16 BRPM

## 2022-08-16 DIAGNOSIS — E78.5 HYPERLIPIDEMIA, UNSPECIFIED HYPERLIPIDEMIA TYPE: ICD-10-CM

## 2022-08-16 DIAGNOSIS — G89.29 CHRONIC MIDLINE LOW BACK PAIN WITHOUT SCIATICA: ICD-10-CM

## 2022-08-16 DIAGNOSIS — I10 ESSENTIAL HYPERTENSION: Primary | ICD-10-CM

## 2022-08-16 DIAGNOSIS — E03.9 HYPOTHYROIDISM, UNSPECIFIED TYPE: ICD-10-CM

## 2022-08-16 DIAGNOSIS — M54.50 CHRONIC MIDLINE LOW BACK PAIN WITHOUT SCIATICA: ICD-10-CM

## 2022-08-16 LAB
ALBUMIN SERPL-MCNC: 4 G/DL (ref 3.5–5.2)
ALBUMIN/GLOB SERPL: 1.2 G/DL
ALP SERPL-CCNC: 223 U/L (ref 39–117)
ALT SERPL-CCNC: 32 U/L (ref 1–33)
AST SERPL-CCNC: 45 U/L (ref 1–32)
BILIRUB SERPL-MCNC: 0.6 MG/DL (ref 0–1.2)
BUN SERPL-MCNC: 8 MG/DL (ref 8–23)
BUN/CREAT SERPL: 14.8 (ref 7–25)
CALCIUM SERPL-MCNC: 9.3 MG/DL (ref 8.6–10.5)
CHLORIDE SERPL-SCNC: 99 MMOL/L (ref 98–107)
CHOLEST SERPL-MCNC: 194 MG/DL (ref 0–200)
CO2 SERPL-SCNC: 24.7 MMOL/L (ref 22–29)
CREAT SERPL-MCNC: 0.54 MG/DL (ref 0.57–1)
EGFRCR-CYS SERPLBLD CKD-EPI 2021: 92.1 ML/MIN/1.73
GLOBULIN SER CALC-MCNC: 3.4 GM/DL
GLUCOSE SERPL-MCNC: 94 MG/DL (ref 65–99)
HDLC SERPL-MCNC: 93 MG/DL (ref 40–60)
LDLC SERPL CALC-MCNC: 89 MG/DL (ref 0–100)
POTASSIUM SERPL-SCNC: 4.1 MMOL/L (ref 3.5–5.2)
PROT SERPL-MCNC: 7.4 G/DL (ref 6–8.5)
SODIUM SERPL-SCNC: 134 MMOL/L (ref 136–145)
T4 FREE SERPL-MCNC: 1.37 NG/DL (ref 0.93–1.7)
TRIGL SERPL-MCNC: 63 MG/DL (ref 0–150)
TSH SERPL DL<=0.005 MIU/L-ACNC: 3.11 UIU/ML (ref 0.27–4.2)
VLDLC SERPL CALC-MCNC: 12 MG/DL (ref 5–40)

## 2022-08-16 PROCEDURE — 99214 OFFICE O/P EST MOD 30 MIN: CPT | Performed by: INTERNAL MEDICINE

## 2022-08-16 RX ORDER — GABAPENTIN 100 MG/1
100 CAPSULE ORAL 3 TIMES DAILY
Qty: 270 CAPSULE | Refills: 0 | Status: SHIPPED | OUTPATIENT
Start: 2022-08-16 | End: 2022-11-30

## 2022-08-16 NOTE — PROGRESS NOTES
Chief Complaint   Patient presents with   • Follow-up     3 month follow up chronic medical problems       History of Present Illness      The patient presents for a follow-up related to hypertension. The patient reports that she has had no headaches, chest pain, dyspnea, edema, syncope, blurred vision or palpitations. She states that she is taking her medication as prescribed. She is not having medication side effects.    The patient presents for a follow-up related to hyperlipidemia. She is following a low fat diet. She reports that she is exercising. She is taking pravastatin. The patient is taking her medication as prescribed. She reports no medication side effects, including muscle cramps, abdominal pain, headaches or weakness. She denies orthopnea, paroxysmal nocturnal dyspnea or dyspnea on exertion.    The patient presents for a follow-up related to hypothyroidism. She reports a good energy level. She reports no hair loss, heat intolerance, cold intolerance, diarrhea, constipation or sweats. She is taking her medication as prescribed.    The patient presents for follow-up of low back pain. She reports that the pain is worsened. The current treatment consists of no specific therapies.  She does not have associated fever, chills, weight loss, rashes, dysuria, hematuria, or a decreased urine stream. The pain does not radiate. There is no numbness. The patient denies loss of bladder control. The patient denies loss of bowel control. The patient has attempted heat, ice, exercise, stretches, Non-Steroidals, Narcotic Analgesics and Acetominophen. The heat did not give pain relief. The ice did not give pain relief. The exercise and stretching did not give pain relief. The Non Steroidal AntiInflammatories did not give relief of the pain. Narcotic Analgesics did not give relief of the pain. Tylenol did not give relief of the pain. The patient reports no aggravating factors.    Review of Systems    CONSTITUTIONAL-  Denies Unexplained Weight Loss, Fever, Chills, Sweats, Weakness or Malaise.    PULMONARY- Denies Wheezing, Sputum Production, Cough, Hemoptysis or Pleuritic Chest Pain.    CARDIOVASCULAR- Denies Claudication or Irregular Heart Beat.    Medications      Current Outpatient Medications:   •  amLODIPine (NORVASC) 2.5 MG tablet, Take 1 tablet by mouth Daily., Disp: 90 tablet, Rfl: 1  •  Cholecalciferol (VITAMIN D3 PO), Take 2,000 Units by mouth Daily., Disp: , Rfl:   •  levothyroxine (SYNTHROID, LEVOTHROID) 75 MCG tablet, Take 1 tablet by mouth Daily., Disp: 90 tablet, Rfl: 1  •  Melatonin 10 MG capsule, Take  by mouth Every Night., Disp: , Rfl:   •  multivitamin with minerals tablet tablet, Take 1 tablet by mouth Daily., Disp: , Rfl:   •  omeprazole (priLOSEC) 20 MG capsule, Take 1 capsule by mouth Daily., Disp: 90 capsule, Rfl: 1  •  pravastatin (PRAVACHOL) 20 MG tablet, Take 1 tablet by mouth Every Night., Disp: 90 tablet, Rfl: 1  •  Probiotic Product (PROBIOTIC ADVANCED PO), Take  by mouth Daily., Disp: , Rfl:      Allergies    Allergies   Allergen Reactions   • Cefadroxil Other (See Comments)     C-DIFF   • Clindamycin/Lincomycin Other (See Comments)     C-DIFF   • Oxycodone GI Intolerance     Nausea and vomiting       Problem List    There is no problem list on file for this patient.      Medications, Allergies, Problems List and Past History were reviewed and updated.    Physical Examination    /80   Pulse 84   Temp 98 °F (36.7 °C) (Infrared)   Resp 16   Wt 56.3 kg (124 lb 3.2 oz)   LMP  (LMP Unknown)   Breastfeeding No   BMI 25.30 kg/m²     HEENT: Facies- Within normal limits. Lids- Normal bilaterally. Conjunctiva- Clear bilaterally. Sclera- Anicteric bilaterally.    Neck: Thyroid- non enlarged, symmetric and has no nodules. No bruits are detected.    Lungs: Auscultation- Clear to auscultation bilaterally. There are no retractions, clubbing or cyanosis. The Expiratory to Inspiratory ratio is  equal.    Cardiovascular: There are no carotid bruits. Heart- Normal Rate with Regular rhythm and no murmurs. There are no gallops. There are no rubs. In the lower extremities there is no edema. The upper extremities do not have edema.    Abdomen: Soft, benign, non-tender with no masses, hernias, organomegaly or scars.    Back: The patient has a normal spinal curvature with no evidence of scoliosis. There are no skin lesions noted on the back. Palpation reveals no tenderness and normal muscle tone. The straight leg raising test is negative. The back has normal flexion, extension, rotation, and lateral bending.    Impression and Assessment    Essential Hypertension.    Hyperlipidemia.    Hypothyroidism.    Low Back Pain.    Plan    Essential Hypertension Plan: The patient was instructed to continue the current medications.    Hyperlipidemia Plan: The patient was instructed to exercise daily, eat a low fat diet and continue her medications.    Hypothyroidism Plan: The patient was instructed to continue the current medications.    Low Back Pain Plan: Medication will be added as noted.    Diagnoses and all orders for this visit:    1. Essential hypertension (Primary)  -     Comprehensive Metabolic Panel; Future    2. Hyperlipidemia, unspecified hyperlipidemia type  -     Comprehensive Metabolic Panel; Future  -     Lipid Panel; Future    3. Hypothyroidism, unspecified type  -     TSH; Future  -     T4, Free; Future    4. Chronic midline low back pain without sciatica  -     gabapentin (NEURONTIN) 100 MG capsule; Take 1 capsule by mouth 3 (Three) Times a Day.  Dispense: 270 capsule; Refill: 0        Return to Office    The patient was instructed to return for follow-up in 4 months. The patient was instructed to return sooner if the condition changes, worsens, or does not resolve.

## 2022-10-10 RX ORDER — AMLODIPINE BESYLATE 2.5 MG/1
TABLET ORAL
Qty: 90 TABLET | Refills: 1 | Status: SHIPPED | OUTPATIENT
Start: 2022-10-10

## 2022-10-10 RX ORDER — LEVOTHYROXINE SODIUM 0.07 MG/1
TABLET ORAL
Qty: 90 TABLET | Refills: 1 | Status: SHIPPED | OUTPATIENT
Start: 2022-10-10

## 2022-10-10 RX ORDER — PRAVASTATIN SODIUM 20 MG
TABLET ORAL
Qty: 90 TABLET | Refills: 1 | Status: SHIPPED | OUTPATIENT
Start: 2022-10-10

## 2022-10-10 RX ORDER — OMEPRAZOLE 20 MG/1
CAPSULE, DELAYED RELEASE ORAL
Qty: 90 CAPSULE | Refills: 1 | Status: SHIPPED | OUTPATIENT
Start: 2022-10-10

## 2022-11-30 DIAGNOSIS — M54.50 CHRONIC MIDLINE LOW BACK PAIN WITHOUT SCIATICA: ICD-10-CM

## 2022-11-30 DIAGNOSIS — G89.29 CHRONIC MIDLINE LOW BACK PAIN WITHOUT SCIATICA: ICD-10-CM

## 2022-11-30 RX ORDER — GABAPENTIN 100 MG/1
CAPSULE ORAL
Qty: 270 CAPSULE | Refills: 1 | Status: SHIPPED | OUTPATIENT
Start: 2022-11-30 | End: 2023-01-19

## 2023-01-19 ENCOUNTER — OFFICE VISIT (OUTPATIENT)
Dept: INTERNAL MEDICINE | Facility: CLINIC | Age: 83
End: 2023-01-19
Payer: MEDICARE

## 2023-01-19 VITALS
HEART RATE: 60 BPM | BODY MASS INDEX: 26.3 KG/M2 | RESPIRATION RATE: 16 BRPM | TEMPERATURE: 98.2 F | DIASTOLIC BLOOD PRESSURE: 80 MMHG | WEIGHT: 129.13 LBS | SYSTOLIC BLOOD PRESSURE: 130 MMHG

## 2023-01-19 DIAGNOSIS — I10 ESSENTIAL HYPERTENSION: Primary | ICD-10-CM

## 2023-01-19 DIAGNOSIS — E03.9 HYPOTHYROIDISM, UNSPECIFIED TYPE: ICD-10-CM

## 2023-01-19 DIAGNOSIS — E78.5 HYPERLIPIDEMIA, UNSPECIFIED HYPERLIPIDEMIA TYPE: ICD-10-CM

## 2023-01-19 DIAGNOSIS — M54.50 CHRONIC MIDLINE LOW BACK PAIN WITHOUT SCIATICA: ICD-10-CM

## 2023-01-19 DIAGNOSIS — G89.29 CHRONIC MIDLINE LOW BACK PAIN WITHOUT SCIATICA: ICD-10-CM

## 2023-01-19 PROCEDURE — 99214 OFFICE O/P EST MOD 30 MIN: CPT | Performed by: INTERNAL MEDICINE

## 2023-01-19 PROCEDURE — 36415 COLL VENOUS BLD VENIPUNCTURE: CPT | Performed by: INTERNAL MEDICINE

## 2023-01-19 RX ORDER — IBUPROFEN 200 MG
600 TABLET ORAL
COMMUNITY

## 2023-01-19 RX ORDER — GABAPENTIN 100 MG/1
200 CAPSULE ORAL 3 TIMES DAILY
Qty: 180 CAPSULE | Refills: 2 | Status: SHIPPED | OUTPATIENT
Start: 2023-01-19 | End: 2023-04-04 | Stop reason: SDUPTHER

## 2023-01-19 NOTE — PROGRESS NOTES
Chief Complaint   Patient presents with   • Follow-up     3 month follow up. Not fasting.        History of Present Illness    The patient presents for follow-up of low back pain. She reports that the pain is worsened. The current treatment consists of anti-inflammatories and gabapentin.  She does not have associated fever, chills, weight loss, rashes, dysuria, hematuria, or a decreased urine stream. The pain does not radiate. There is no numbness. The patient denies loss of bladder control. The patient denies loss of bowel control. The patient has attempted hot packs. The heat did give pain relief. The patient reports no aggravating factors.    The patient presents for a follow-up related to hypertension. The patient reports that she has had no headaches, chest pain, dyspnea, edema, syncope, blurred vision or palpitations. She states that she is taking her medication as prescribed. She is not having medication side effects.    The patient presents for a follow-up related to hyperlipidemia. She is following a low fat diet. She reports that she is exercising. She is taking pravastatin. The patient is taking her medication as prescribed. She reports no medication side effects, including muscle cramps, abdominal pain, headaches or weakness. She denies orthopnea, paroxysmal nocturnal dyspnea or dyspnea on exertion.    The patient presents for a follow-up related to hypothyroidism. She reports a good energy level. She reports no hair loss, heat intolerance, cold intolerance, diarrhea, constipation or sweats. She is taking her medication as prescribed.    Medications      Current Outpatient Medications:   •  amLODIPine (NORVASC) 2.5 MG tablet, TAKE 1 TABLET EVERY DAY, Disp: 90 tablet, Rfl: 1  •  Cholecalciferol (VITAMIN D3 PO), Take 2,000 Units by mouth Daily., Disp: , Rfl:   •  gabapentin (NEURONTIN) 100 MG capsule, TAKE 1 CAPSULE THREE TIMES DAILY, Disp: 270 capsule, Rfl: 1  •  ibuprofen (ADVIL,MOTRIN) 200 MG tablet,  Take 600 mg by mouth., Disp: , Rfl:   •  levothyroxine (SYNTHROID, LEVOTHROID) 75 MCG tablet, TAKE 1 TABLET EVERY DAY (DOSE CHANGE), Disp: 90 tablet, Rfl: 1  •  Melatonin 10 MG capsule, Take  by mouth Every Night., Disp: , Rfl:   •  omeprazole (priLOSEC) 20 MG capsule, TAKE 1 CAPSULE EVERY DAY, Disp: 90 capsule, Rfl: 1  •  pravastatin (PRAVACHOL) 20 MG tablet, TAKE 1 TABLET EVERY NIGHT, Disp: 90 tablet, Rfl: 1  •  Probiotic Product (PROBIOTIC ADVANCED PO), Take  by mouth Daily., Disp: , Rfl:      Allergies    Allergies   Allergen Reactions   • Cefadroxil Other (See Comments)     C-DIFF   • Clindamycin/Lincomycin Other (See Comments)     C-DIFF   • Oxycodone GI Intolerance     Nausea and vomiting       Problem List    There is no problem list on file for this patient.      Medications, Allergies, Problems List and Past History were reviewed and updated.    Physical Examination    /80 (BP Location: Left arm, Patient Position: Sitting)   Pulse 60   Temp 98.2 °F (36.8 °C) (Temporal)   Resp 16   Wt 58.6 kg (129 lb 2 oz)   LMP  (LMP Unknown)   BMI 26.30 kg/m²       HEENT: Facies- Within normal limits. Lids- Normal bilaterally. Conjunctiva- Clear bilaterally. Sclera- Anicteric bilaterally.    Neck: Thyroid- non enlarged, symmetric and has no nodules. No bruits are detected.    Lungs: Auscultation- Clear to auscultation bilaterally. There are no retractions, clubbing or cyanosis. The Expiratory to Inspiratory ratio is equal.    Cardiovascular: There are no carotid bruits. Heart- Normal Rate with Regular rhythm and no murmurs. There are no gallops. There are no rubs. In the lower extremities there is no edema. The upper extremities do not have edema.    Abdomen: Soft, benign, non-tender with no masses, hernias, organomegaly or scars.    Back: The patient has a normal spinal curvature with no evidence of scoliosis. There are no skin lesions noted on the back. Palpation reveals tenderness over the lumbar spine and  lumbar paraspinal muscles and normal muscle tone. The straight leg raising test is negative. The back has normal flexion, extension, rotation, and lateral bending.    Impression and Assessment    Low Back Pain.    Essential Hypertension.    Hyperlipidemia.    Hypothyroidism.    Plan    Essential Hypertension Plan: The patient was instructed to continue the current medications.    Hyperlipidemia Plan: The patient was instructed to exercise daily, eat a low fat diet and continue her medications.    Hypothyroidism Plan: The patient was instructed to continue the current medications.    Low Back Pain Plan: The medications were adjusted as noted.    Diagnoses and all orders for this visit:    1. Essential hypertension (Primary)  -     Comprehensive Metabolic Panel; Future  -     Comprehensive Metabolic Panel    2. Hyperlipidemia, unspecified hyperlipidemia type  -     Comprehensive Metabolic Panel; Future  -     Lipid Panel; Future  -     Lipid Panel  -     Comprehensive Metabolic Panel    3. Hypothyroidism, unspecified type  -     T4, Free; Future  -     TSH; Future  -     TSH  -     T4, Free    4. Chronic midline low back pain without sciatica  -     gabapentin (NEURONTIN) 100 MG capsule; Take 2 capsules by mouth 3 (Three) Times a Day.  Dispense: 180 capsule; Refill: 2        Return to Office    The patient was instructed to return for follow-up in 4 months. The patient was instructed to return sooner if the condition changes, worsens, or does not resolve.

## 2023-01-20 LAB
ALBUMIN SERPL-MCNC: 3.8 G/DL (ref 3.5–5.2)
ALBUMIN/GLOB SERPL: 1.2 G/DL
ALP SERPL-CCNC: 195 U/L (ref 39–117)
ALT SERPL-CCNC: 31 U/L (ref 1–33)
AST SERPL-CCNC: 48 U/L (ref 1–32)
BILIRUB SERPL-MCNC: 0.4 MG/DL (ref 0–1.2)
BUN SERPL-MCNC: 10 MG/DL (ref 8–23)
BUN/CREAT SERPL: 18.2 (ref 7–25)
CALCIUM SERPL-MCNC: 9.1 MG/DL (ref 8.6–10.5)
CHLORIDE SERPL-SCNC: 98 MMOL/L (ref 98–107)
CHOLEST SERPL-MCNC: 177 MG/DL (ref 0–200)
CO2 SERPL-SCNC: 26.1 MMOL/L (ref 22–29)
CREAT SERPL-MCNC: 0.55 MG/DL (ref 0.57–1)
EGFRCR SERPLBLD CKD-EPI 2021: 91.6 ML/MIN/1.73
GLOBULIN SER CALC-MCNC: 3.3 GM/DL
GLUCOSE SERPL-MCNC: 97 MG/DL (ref 65–99)
HDLC SERPL-MCNC: 85 MG/DL (ref 40–60)
LDLC SERPL CALC-MCNC: 83 MG/DL (ref 0–100)
POTASSIUM SERPL-SCNC: 4.4 MMOL/L (ref 3.5–5.2)
PROT SERPL-MCNC: 7.1 G/DL (ref 6–8.5)
SODIUM SERPL-SCNC: 134 MMOL/L (ref 136–145)
T4 FREE SERPL-MCNC: 1.34 NG/DL (ref 0.93–1.7)
TRIGL SERPL-MCNC: 43 MG/DL (ref 0–150)
TSH SERPL DL<=0.005 MIU/L-ACNC: 2.58 UIU/ML (ref 0.27–4.2)
VLDLC SERPL CALC-MCNC: 9 MG/DL (ref 5–40)

## 2023-04-04 DIAGNOSIS — M54.50 CHRONIC MIDLINE LOW BACK PAIN WITHOUT SCIATICA: ICD-10-CM

## 2023-04-04 DIAGNOSIS — G89.29 CHRONIC MIDLINE LOW BACK PAIN WITHOUT SCIATICA: ICD-10-CM

## 2023-04-04 RX ORDER — GABAPENTIN 100 MG/1
200 CAPSULE ORAL 3 TIMES DAILY
Qty: 180 CAPSULE | Refills: 2 | Status: SHIPPED | OUTPATIENT
Start: 2023-04-04

## 2023-05-23 ENCOUNTER — OFFICE VISIT (OUTPATIENT)
Dept: INTERNAL MEDICINE | Facility: CLINIC | Age: 83
End: 2023-05-23
Payer: MEDICARE

## 2023-05-23 VITALS
DIASTOLIC BLOOD PRESSURE: 74 MMHG | RESPIRATION RATE: 18 BRPM | HEIGHT: 59 IN | BODY MASS INDEX: 27.01 KG/M2 | WEIGHT: 134 LBS | TEMPERATURE: 98.2 F | SYSTOLIC BLOOD PRESSURE: 122 MMHG | HEART RATE: 84 BPM

## 2023-05-23 DIAGNOSIS — R30.0 DYSURIA: ICD-10-CM

## 2023-05-23 DIAGNOSIS — K21.00 GASTROESOPHAGEAL REFLUX DISEASE WITH ESOPHAGITIS WITHOUT HEMORRHAGE: ICD-10-CM

## 2023-05-23 DIAGNOSIS — D64.9 ANEMIA, UNSPECIFIED TYPE: ICD-10-CM

## 2023-05-23 DIAGNOSIS — Z11.59 ENCOUNTER FOR SCREENING FOR OTHER VIRAL DISEASES: ICD-10-CM

## 2023-05-23 DIAGNOSIS — R01.1 HEART MURMUR: ICD-10-CM

## 2023-05-23 DIAGNOSIS — K74.3 HEPATIC CIRRHOSIS DUE TO PRIMARY BILIARY CHOLANGITIS: ICD-10-CM

## 2023-05-23 DIAGNOSIS — E03.9 ACQUIRED HYPOTHYROIDISM: ICD-10-CM

## 2023-05-23 DIAGNOSIS — E78.2 MIXED HYPERLIPIDEMIA: ICD-10-CM

## 2023-05-23 DIAGNOSIS — Z00.00 PHYSICAL EXAM: ICD-10-CM

## 2023-05-23 DIAGNOSIS — I10 PRIMARY HYPERTENSION: ICD-10-CM

## 2023-05-23 DIAGNOSIS — Z00.00 MEDICARE ANNUAL WELLNESS VISIT, SUBSEQUENT: Primary | ICD-10-CM

## 2023-05-23 DIAGNOSIS — R35.1 NOCTURIA: ICD-10-CM

## 2023-05-23 DIAGNOSIS — R73.02 IMPAIRED GLUCOSE TOLERANCE: ICD-10-CM

## 2023-05-23 PROBLEM — K74.60 CIRRHOSIS OF LIVER: Status: ACTIVE | Noted: 2020-12-16

## 2023-05-23 PROBLEM — I83.90 VARICOSE VEINS OF LOWER EXTREMITY: Status: ACTIVE | Noted: 2017-10-10

## 2023-05-23 PROBLEM — K21.9 GERD (GASTROESOPHAGEAL REFLUX DISEASE): Status: ACTIVE | Noted: 2023-05-23

## 2023-05-23 LAB
BILIRUB BLD-MCNC: NEGATIVE MG/DL
CLARITY, POC: ABNORMAL
COLOR UR: YELLOW
EXPIRATION DATE: ABNORMAL
GLUCOSE UR STRIP-MCNC: NEGATIVE MG/DL
KETONES UR QL: NEGATIVE
LEUKOCYTE EST, POC: ABNORMAL
Lab: ABNORMAL
NITRITE UR-MCNC: NEGATIVE MG/ML
PH UR: 7 [PH] (ref 5–8)
PROT UR STRIP-MCNC: NEGATIVE MG/DL
RBC # UR STRIP: NEGATIVE /UL
SP GR UR: 1 (ref 1–1.03)
UROBILINOGEN UR QL: NORMAL

## 2023-05-23 RX ORDER — LEVOTHYROXINE SODIUM 0.07 MG/1
75 TABLET ORAL DAILY
Qty: 90 TABLET | Refills: 3 | Status: SHIPPED | OUTPATIENT
Start: 2023-05-23

## 2023-05-23 RX ORDER — AMLODIPINE BESYLATE 2.5 MG/1
2.5 TABLET ORAL DAILY
Qty: 90 TABLET | Refills: 3 | Status: SHIPPED | OUTPATIENT
Start: 2023-05-23

## 2023-05-23 RX ORDER — PRAVASTATIN SODIUM 20 MG
20 TABLET ORAL NIGHTLY
Qty: 90 TABLET | Refills: 3 | Status: SHIPPED | OUTPATIENT
Start: 2023-05-23

## 2023-05-23 RX ORDER — URSODIOL 500 MG/1
500 TABLET, FILM COATED ORAL 2 TIMES DAILY WITH MEALS
Qty: 180 TABLET | Refills: 3 | Status: SHIPPED | OUTPATIENT
Start: 2023-05-23 | End: 2024-05-22

## 2023-05-23 RX ORDER — OMEPRAZOLE 20 MG/1
20 CAPSULE, DELAYED RELEASE ORAL DAILY
Qty: 90 CAPSULE | Refills: 3 | Status: SHIPPED | OUTPATIENT
Start: 2023-05-23

## 2023-05-23 NOTE — PROGRESS NOTES
"Med-Peds Progress Note     Subjective    Chief Complaint: follow up and annual health visit.   Independent Historian(s): Patient.    Used: N/A.     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 She is not on medication for this condition. She was diagnosed within past 2 years. She last saw GI at  about 1 year ago at the time when she'd had a Velma Leonardo esophageal bleed. But has not been able to establish with GI since that time. She denies abdominal pain, constipation, diarrhea, jaundice, pruritis, rash, easy bleeding/epistaxis/bruising, dizziness, light headedness, headaches, vision changes, arthralgias, joint stiffness.    Petty has high blood pressure. She takes her medication as prescribed. Her BP is normal when checked. She denies headaches, palpitations, tinnitus, snoring, chest pain, dyspnea, cough, nausea, vomiting, diarrhea, constipation.    Petty reports 2 weeks of dysuria and urinary urgency. Also waking from symptoms. Since she already had appointment today has not yet been seen. Intermittent nausea and pelvic discomfort. Worried she has a UTI. Denies hematuria, fever, chills, vomiting, diarrhea, constipation, cough, congestion, vaginal pain/bleeding/discharge, rash, urinary retention.    ROS  Refer to HPI above for pertinent positives and negatives.     The following components of the patient's history have been personally reviewed and updated where appropriate: past medical history, allergies, surgical history, social history, family history, and medications. Pertinent findings or changes can be found in the HPI or A/P in this note.        Objective     /74   Pulse 84   Temp 98.2 °F (36.8 °C) (Infrared)   Resp 18   Ht 149.9 cm (59\")   Wt 60.8 kg (134 lb)   LMP  (LMP Unknown)   BMI 27.06 kg/m²       Physical Exam  General: no acute distress, overweight   Neuro: awake, alert, moves all 4 extremities   Psych: euthymic, full affect   HEENT: moist mucous membranes, conjunctivae moist and pink   Cardiac: " audible S1 and S2, systolic murmur, RRR   Respiratory: equal chest rise and fall, lungs clear bilaterally   GI: soft, non-distended, normoactive BS, mild suprapubic tenderness  Msk: no lower extremity edema, normal tone   Skin: warm, dry        Assessment & Plan    Summary: Petty is a 82 y.o. 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 Pt is adamant about not wanting future colonoscopy despite high risk hx. She has expressed clear understanding of risk of colon cancer. Also decline Cologuard. But she is amenable to checking in with a GI specialist for surveillance of her PBC. She is not on therapy and has had no lab surveillance to monitor her diagnosis.  PLAN   Ordered labs to assess severity: GGT, CMP, PT/INR, CBCd.  Ordered liver US for cirrhosis and HCC screening.  Ordered GI referral given need for annual surveillance.  Start ursodiol 500 mg PO BID.  Ordered hep B Ab and Hep A Ab to check for immunity.    Anemia of chronic disease  Newly identified systolic murmur  No prior report of murmur, no per our assessment. Suspect from anemia. Anemia risk due to her cirrhosis and hx of GI bleeding.  PLAN  Ordered CBCd and iron panel.  Ordered complete echo.    Acute onset dysuria and nocturia  Favor acute cystitis given symptoms. Though also consider diabetes given autoimmune hx.  PLAN  Ordered UA with micro and culture. Follow up for antibiotic plan if indicated.  Ordered Hgb A1c.    Chronic, stable, controlled conditions  # Hypothyroidism: levothyroxine 75 mcg PO daily, ordered TSH/fT4  # Essential HTN: amlodipine 2.5 mg PO daily, goal BP <120/80  # GERD: omeprazole 20 mg PO daily  # HLD: pravastatin 20 mg PO daily, ordered lipid panel  # Chronic back pain with sciatica: gabapentin 200 mg PO BID PRN    Healthcare Maintenance   Denies smoking, alcohol, or  other substance use.   Immunizations personally reviewed. Zoster #2 at pharmacy. Checking Hep B titer.  Patient declines all forms of colon cancer screening. Risk vs benefit reviewed.  Mammogram and pap passed. Pt has info to schedule own mammogram if needed.  BMI is >= 25 and <30. (Overweight) The following options were offered after discussion;: exercise counseling/recommendations and nutrition counseling/recommendations    Follow up in 5-6 months.          Gabby Kahler, MD  Med Peds PGY3    Attending Note:   Patient was personally seen and examined by me.  I have obtained and corroborated the history and examination as documented above, and agree with the accuracy of the documented information.  All orders were reviewed and personally signed by me.     Diagnoses and all orders for this visit:    1. Medicare annual wellness visit, subsequent (Primary)    2. Physical exam    3. Gastroesophageal reflux disease with esophagitis without hemorrhage  -     Ambulatory Referral to Gastroenterology  -     omeprazole (priLOSEC) 20 MG capsule; Take 1 capsule by mouth Daily.  Dispense: 90 capsule; Refill: 3    4. 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  Dispense: 180 tablet; Refill: 3  -     US Abdomen Limited; Future  -     Gamma GT  -     Hepatitis A Antibody, Total  -     Hepatitis B Surface Antibody  -     CBC w AUTO Differential  -     Protime-INR  -     Comprehensive metabolic panel    5. Dysuria  -     POCT urinalysis dipstick, automated  -     MicroAlbumin, Urine, Random - Urine, Clean Catch; Future  -     Urine Culture - Urine, Urine, Clean  Catch; Future  -     Urine Culture - Urine, Urine, Clean Catch  -     MicroAlbumin, Urine, Random - Urine, Clean Catch    6. Nocturia  -     POCT urinalysis dipstick, automated  -     MicroAlbumin, Urine, Random - Urine, Clean Catch; Future  -     Urine Culture - Urine, Urine, Clean Catch; Future  -     Urine Culture - Urine, Urine, Clean Catch  -     MicroAlbumin, Urine, Random - Urine, Clean Catch    7. Acquired hypothyroidism  -     levothyroxine (SYNTHROID, LEVOTHROID) 75 MCG tablet; Take 1 tablet by mouth Daily.  Dispense: 90 tablet; Refill: 3    8. Mixed hyperlipidemia  -     pravastatin (PRAVACHOL) 20 MG tablet; Take 1 tablet by mouth Every Night.  Dispense: 90 tablet; Refill: 3    9. Primary hypertension  -     amLODIPine (NORVASC) 2.5 MG tablet; Take 1 tablet by mouth Daily.  Dispense: 90 tablet; Refill: 3    10. Anemia, unspecified type  -     Iron and TIBC; Future  -     Ferritin; Future  -     Ferritin  -     Iron and TIBC    11. Impaired glucose tolerance  -     Hemoglobin A1c; Future  -     Hemoglobin A1c    12. Heart murmur  -     Adult Transthoracic Echo Complete W/ Cont if Necessary Per Protocol; Future    13. Encounter for screening for other viral diseases  -     Hepatitis B Surface Antibody; Future  -     Hepatitis B Surface Antibody

## 2023-05-23 NOTE — PROGRESS NOTES
The ABCs of the Annual Wellness Visit  Subsequent Medicare Wellness Visit    Subjective    Petty Artis is a 82 y.o. female who presents for a Subsequent Medicare Wellness Visit.    The following portions of the patient's history were reviewed and   updated as appropriate: allergies, current medications, past family history, past medical history, past social history, past surgical history and problem list.    Compared to one year ago, the patient feels her physical   health is the same.    Compared to one year ago, the patient feels her mental   health is the same.    Recent Hospitalizations:  She was admitted to [facility] during the last year.       Current Medical Providers:  Patient Care Team:  Lopez Dumont MD as PCP - General (Internal Medicine)    Outpatient Medications Prior to Visit   Medication Sig Dispense Refill   • amLODIPine (NORVASC) 2.5 MG tablet TAKE 1 TABLET EVERY DAY 90 tablet 1   • Cholecalciferol (VITAMIN D3 PO) Take 2,000 Units by mouth Daily.     • gabapentin (NEURONTIN) 100 MG capsule Take 2 capsules by mouth 3 (Three) Times a Day. 180 capsule 2   • ibuprofen (ADVIL,MOTRIN) 200 MG tablet Take 3 tablets by mouth Every 8 (Eight) Hours As Needed.     • levothyroxine (SYNTHROID, LEVOTHROID) 75 MCG tablet TAKE 1 TABLET EVERY DAY (DOSE CHANGE) 90 tablet 1   • Melatonin 10 MG capsule Take  by mouth Every Night.     • omeprazole (priLOSEC) 20 MG capsule TAKE 1 CAPSULE EVERY DAY 90 capsule 1   • pravastatin (PRAVACHOL) 20 MG tablet TAKE 1 TABLET EVERY NIGHT 90 tablet 1   • Probiotic Product (PROBIOTIC ADVANCED PO) Take  by mouth Daily.       No facility-administered medications prior to visit.       No opioid medication identified on active medication list. I have reviewed chart for other potential  high risk medication/s and harmful drug interactions in the elderly.          Aspirin is not on active medication list.  Aspirin use is not indicated based on review of  "current medical condition/s. Risk of harm outweighs potential benefits.  .    There is no problem list on file for this patient.    Advance Care Planning   Advance Care Planning     Advance Directive is not on file.  ACP discussion was held with the patient during this visit. Patient does not have an advance directive, information provided.     Objective    Vitals:    23 1031   BP: 156/78   BP Location: Left arm   Patient Position: Sitting   Cuff Size: Adult   Pulse: 84   Resp: 18   Temp: 98.2 °F (36.8 °C)   TempSrc: Infrared   Weight: 60.8 kg (134 lb)   Height: 149.9 cm (59\")   PainSc:   5   PainLoc: Back     Estimated body mass index is 27.06 kg/m² as calculated from the following:    Height as of this encounter: 149.9 cm (59\").    Weight as of this encounter: 60.8 kg (134 lb).    BMI is >= 25 and <30. (Overweight) The following options were offered after discussion;: weight loss educational material (shared in after visit summary), exercise counseling/recommendations and nutrition counseling/recommendations    Finger Rub Hearing{Test (right ear):passed  Finger Rub Hearing{Test (left ear):failed      Does the patient have evidence of cognitive impairment?   No            HEALTH RISK ASSESSMENT    Smoking Status:  Social History     Tobacco Use   Smoking Status Never   Smokeless Tobacco Never     Alcohol Consumption:  Social History     Substance and Sexual Activity   Alcohol Use Not Currently    Comment: rarely     Fall Risk Screen:    STEADI Fall Risk Assessment was completed, and patient is at LOW risk for falls.Assessment completed on:2023    Depression Screenin/23/2023    10:40 AM   PHQ-2/PHQ-9 Depression Screening   Little Interest or Pleasure in Doing Things 0-->not at all   Feeling Down, Depressed or Hopeless 0-->not at all   PHQ-9: Brief Depression Severity Measure Score 0       Health Habits and Functional and Cognitive Screenin/23/2023    10:38 AM   Functional & Cognitive " Status   Do you have difficulty preparing food and eating? No   Do you have difficulty bathing yourself, getting dressed or grooming yourself? No   Do you have difficulty using the toilet? No   Do you have difficulty moving around from place to place? No   Do you have trouble with steps or getting out of a bed or a chair? No   Current Diet Unhealthy Diet   Dental Exam Up to date   Eye Exam Not up to date   Exercise (times per week) 0 times per week   Current Exercises Include No Regular Exercise   Do you need help using the phone?  No   Are you deaf or do you have serious difficulty hearing?  No   Do you need help with transportation? No   Do you need help shopping? No   Do you need help preparing meals?  No   Do you need help with housework?  No   Do you need help with laundry? No   Do you need help taking your medications? No   Do you need help managing money? No   Do you ever drive or ride in a car without wearing a seat belt? No   Have you felt unusual stress, anger or loneliness in the last month? Yes   Who do you live with? Alone   If you need help, do you have trouble finding someone available to you? No   Have you been bothered in the last four weeks by sexual problems? No   Do you have difficulty concentrating, remembering or making decisions? No       Age-appropriate Screening Schedule:  Refer to the list below for future screening recommendations based on patient's age, sex and/or medical conditions. Orders for these recommended tests are listed in the plan section. The patient has been provided with a written plan.    Health Maintenance   Topic Date Due   • URINE MICROALBUMIN  Never done   • Hepatitis B (1 of 3 - Risk 3-dose series) Never done   • DIABETIC EYE EXAM  Never done   • ZOSTER VACCINE (2 of 3) 09/09/2017   • HEMOGLOBIN A1C  10/15/2021   • ANNUAL WELLNESS VISIT  12/16/2021   • COVID-19 Vaccine (5 - Booster for Moderna series) 01/18/2022   • INFLUENZA VACCINE  08/01/2023   • DXA SCAN  12/03/2023    • LIPID PANEL  01/19/2024   • TDAP/TD VACCINES (2 - Td or Tdap) 04/24/2025   • Pneumococcal Vaccine 65+  Completed                  CMS Preventative Services Quick Reference  Risk Factors Identified During Encounter:    Immunizations Discussed/Encouraged: Hepatitis B Vaccine/Series and Shingrix    The above risks/problems have been discussed with the patient.  Pertinent information has been shared with the patient in the After Visit Summary.      Diagnoses and all orders for this visit:    1. Medicare annual wellness visit, subsequent (Primary)    2. Physical exam    3. Gastroesophageal reflux disease with esophagitis without hemorrhage  -     Ambulatory Referral to Gastroenterology  -     omeprazole (priLOSEC) 20 MG capsule; Take 1 capsule by mouth Daily.  Dispense: 90 capsule; Refill: 3    4. 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  Dispense: 180 tablet; Refill: 3  -      Abdomen Limited; Future    5. Dysuria  -     POCT urinalysis dipstick, automated  -     MicroAlbumin, Urine, Random - Urine, Clean Catch; Future  -     Urine Culture - Urine, Urine, Clean Catch; Future    6. Nocturia  -     POCT urinalysis dipstick, automated  -     MicroAlbumin, Urine, Random - Urine, Clean Catch; Future  -     Urine Culture - Urine, Urine, Clean Catch; Future    7. Acquired hypothyroidism  -     levothyroxine (SYNTHROID, LEVOTHROID) 75 MCG tablet; Take 1 tablet by mouth Daily.  Dispense: 90 tablet; Refill: 3    8. Mixed hyperlipidemia  -     pravastatin (PRAVACHOL) 20 MG tablet; Take 1 tablet by mouth Every Night.  Dispense: 90 tablet; Refill: 3    9.  Primary hypertension  -     amLODIPine (NORVASC) 2.5 MG tablet; Take 1 tablet by mouth Daily.  Dispense: 90 tablet; Refill: 3    10. Anemia, unspecified type  -     Iron and TIBC; Future  -     Ferritin; Future    11. Impaired glucose tolerance  -     Hemoglobin A1c; Future    12. Heart murmur  -     Adult Transthoracic Echo Complete W/ Cont if Necessary Per Protocol; Future    13. Encounter for screening for other viral diseases  -     Hepatitis B Surface Antibody; Future        Follow Up:   Next Medicare Wellness visit to be scheduled in 1 year.      An After Visit Summary and PPPS were made available to the patient.

## 2023-05-24 LAB
ALBUMIN SERPL-MCNC: 3.9 G/DL (ref 3.6–4.6)
ALBUMIN/GLOB SERPL: 1.1 {RATIO} (ref 1.2–2.2)
ALP SERPL-CCNC: 187 IU/L (ref 44–121)
ALT SERPL-CCNC: 30 IU/L (ref 0–32)
AST SERPL-CCNC: 48 IU/L (ref 0–40)
BASOPHILS # BLD AUTO: 0.1 X10E3/UL (ref 0–0.2)
BASOPHILS NFR BLD AUTO: 1 %
BILIRUB SERPL-MCNC: 0.8 MG/DL (ref 0–1.2)
BUN SERPL-MCNC: 7 MG/DL (ref 8–27)
BUN/CREAT SERPL: 14 (ref 12–28)
CALCIUM SERPL-MCNC: 9 MG/DL (ref 8.7–10.3)
CHLORIDE SERPL-SCNC: 101 MMOL/L (ref 96–106)
CO2 SERPL-SCNC: 24 MMOL/L (ref 20–29)
CREAT SERPL-MCNC: 0.49 MG/DL (ref 0.57–1)
EGFRCR SERPLBLD CKD-EPI 2021: 94 ML/MIN/1.73
EOSINOPHIL # BLD AUTO: 0.1 X10E3/UL (ref 0–0.4)
EOSINOPHIL NFR BLD AUTO: 2 %
ERYTHROCYTE [DISTWIDTH] IN BLOOD BY AUTOMATED COUNT: 18.7 % (ref 11.7–15.4)
FERRITIN SERPL-MCNC: 10 NG/ML (ref 15–150)
GGT SERPL-CCNC: 303 IU/L (ref 0–60)
GLOBULIN SER CALC-MCNC: 3.7 G/DL (ref 1.5–4.5)
GLUCOSE SERPL-MCNC: 104 MG/DL (ref 70–99)
HAV AB SER QL IA: NEGATIVE
HBA1C MFR BLD: 5.5 % (ref 4.8–5.6)
HBV SURFACE AB SER QL: NON REACTIVE
HCT VFR BLD AUTO: 29.8 % (ref 34–46.6)
HGB BLD-MCNC: 8.3 G/DL (ref 11.1–15.9)
IMM GRANULOCYTES # BLD AUTO: 0 X10E3/UL (ref 0–0.1)
IMM GRANULOCYTES NFR BLD AUTO: 1 %
INR PPP: 1 (ref 0.9–1.2)
IRON SATN MFR SERPL: 3 % (ref 15–55)
IRON SERPL-MCNC: 16 UG/DL (ref 27–139)
LYMPHOCYTES # BLD AUTO: 1.3 X10E3/UL (ref 0.7–3.1)
LYMPHOCYTES NFR BLD AUTO: 37 %
MCH RBC QN AUTO: 19.2 PG (ref 26.6–33)
MCHC RBC AUTO-ENTMCNC: 27.9 G/DL (ref 31.5–35.7)
MCV RBC AUTO: 69 FL (ref 79–97)
MONOCYTES # BLD AUTO: 0.2 X10E3/UL (ref 0.1–0.9)
MONOCYTES NFR BLD AUTO: 7 %
NEUTROPHILS # BLD AUTO: 1.8 X10E3/UL (ref 1.4–7)
NEUTROPHILS NFR BLD AUTO: 52 %
PLATELET # BLD AUTO: 242 X10E3/UL (ref 150–450)
POTASSIUM SERPL-SCNC: 4.3 MMOL/L (ref 3.5–5.2)
PROT SERPL-MCNC: 7.6 G/DL (ref 6–8.5)
PROTHROMBIN TIME: 10.4 SEC (ref 9.1–12)
RBC # BLD AUTO: 4.32 X10E6/UL (ref 3.77–5.28)
SODIUM SERPL-SCNC: 137 MMOL/L (ref 134–144)
TIBC SERPL-MCNC: 471 UG/DL (ref 250–450)
UIBC SERPL-MCNC: 455 UG/DL (ref 118–369)
WBC # BLD AUTO: 3.5 X10E3/UL (ref 3.4–10.8)

## 2023-05-25 NOTE — PATIENT INSTRUCTIONS
Medicare Wellness  Personal Prevention Plan of Service     Date of Office Visit:    Encounter Provider:  Lopez Dumont MD  Place of Service:  [facility]  Patient Name: Petty Artis  :  1940    As part of the Medicare Wellness portion of your visit today, we are providing you with this personalized preventive plan of services (PPPS). This plan is based upon recommendations of the [place] Preventive Services Task Force (USPSTF) and the Advisory Committee on Immunization Practices (ACIP).    This lists the preventive care services that should be considered, and provides dates of when you are due. Items listed as completed are up-to-date and do not require any further intervention.    Health Maintenance   Topic Date Due    URINE MICROALBUMIN  Never done    Hepatitis B (1 of 3 - Risk 3-dose series) Never done    ZOSTER VACCINE (2 of 3) 2017    COVID-19 Vaccine (5 - Booster for Moderna series) 2022    INFLUENZA VACCINE  2023    DXA SCAN  2023    LIPID PANEL  2024    ANNUAL WELLNESS VISIT  2024    TDAP/TD VACCINES (2 - Td or Tdap) 2025    Pneumococcal Vaccine 65+  Completed    HEMOGLOBIN A1C  Discontinued    DIABETIC EYE EXAM  Discontinued       Orders Placed This Encounter   Procedures    Urine Culture - Urine, Urine, Clean Catch     Standing Status:   Future     Number of Occurrences:   1     Standing Expiration Date:   2024    US Abdomen Limited     RUQ     Standing Status:   Future     Standing Expiration Date:   2024     Scheduling Instructions:      RUQ     Order Specific Question:   Reason for Exam:     Answer:   PBC    MicroAlbumin, Urine, Random - Urine, Clean Catch     Standing Status:   Future     Number of Occurrences:   1     Standing Expiration Date:   2024     Order Specific Question:   Release to patient     Answer:   Routine Release    Comprehensive metabolic panel     Standing  Status:   Future     Number of Occurrences:   1     Standing Expiration Date:   5/23/2024     Order Specific Question:   Release to patient     Answer:   Routine Release    Protime-INR     Standing Status:   Future     Number of Occurrences:   1     Standing Expiration Date:   5/23/2024     Order Specific Question:   Release to patient     Answer:   Routine Release    Hepatitis B Surface Antibody     Standing Status:   Future     Number of Occurrences:   1     Standing Expiration Date:   5/23/2024     Order Specific Question:   Release to patient     Answer:   Routine Release    Hepatitis A Antibody, Total     Standing Status:   Future     Number of Occurrences:   1     Standing Expiration Date:   5/23/2024     Order Specific Question:   Release to patient     Answer:   Routine Release    Iron and TIBC     Standing Status:   Future     Number of Occurrences:   1     Standing Expiration Date:   5/23/2024     Order Specific Question:   Release to patient     Answer:   Routine Release    Ferritin     Standing Status:   Future     Number of Occurrences:   1     Standing Expiration Date:   5/23/2024     Order Specific Question:   Release to patient     Answer:   Routine Release    Gamma GT     Standing Status:   Future     Number of Occurrences:   1     Standing Expiration Date:   5/23/2024     Order Specific Question:   Release to patient     Answer:   Routine Release    Hemoglobin A1c     Standing Status:   Future     Number of Occurrences:   1     Standing Expiration Date:   5/23/2024     Order Specific Question:   Release to patient     Answer:   Routine Release    Ambulatory Referral to Gastroenterology     Referral Priority:   Routine     Referral Type:   Consultation     Referral Reason:   Specialty Services Required     Referred to Provider:   Escobar Arnold MD     Requested Specialty:   Gastroenterology     Number of Visits Requested:   1    POCT urinalysis dipstick, automated     Order Specific Question:    Release to patient     Answer:   Routine Release    Adult Transthoracic Echo Complete W/ Cont if Necessary Per Protocol     Standing Status:   Future     Standing Expiration Date:   5/23/2024     Order Specific Question:   Reason for exam?     Answer:   Murmur or Click     Order Specific Question:   Murmur or Click specification?     Answer:   Suspicion of Valvular Heart Disease    CBC w AUTO Differential     Standing Status:   Future     Number of Occurrences:   1     Standing Expiration Date:   5/23/2024     Order Specific Question:   Manual Differential     Answer:   No       Return in about 6 months (around 11/23/2023) for Follow-up.

## 2023-05-29 LAB
BACTERIA UR CULT: ABNORMAL
BACTERIA UR CULT: ABNORMAL
MICROALBUMIN UR-MCNC: 5.1 UG/ML
OTHER ANTIBIOTIC SUSC ISLT: ABNORMAL

## 2023-05-30 ENCOUNTER — TELEPHONE (OUTPATIENT)
Dept: INTERNAL MEDICINE | Facility: CLINIC | Age: 83
End: 2023-05-30

## 2023-05-30 DIAGNOSIS — D50.9 IRON DEFICIENCY ANEMIA, UNSPECIFIED IRON DEFICIENCY ANEMIA TYPE: ICD-10-CM

## 2023-05-30 DIAGNOSIS — N30.00 ACUTE CYSTITIS WITHOUT HEMATURIA: Primary | ICD-10-CM

## 2023-05-30 RX ORDER — SULFAMETHOXAZOLE AND TRIMETHOPRIM 800; 160 MG/1; MG/1
1 TABLET ORAL 2 TIMES DAILY
Qty: 20 TABLET | Refills: 0 | Status: SHIPPED | OUTPATIENT
Start: 2023-05-30

## 2023-05-30 NOTE — TELEPHONE ENCOUNTER
Caller: Petty Artis    Relationship: Self    Best call back number:     What is the best time to reach you: ANYTIME    Who are you requesting to speak with (clinical staff, provider,  specific staff member): CLINICAL STAFF    Do you know the name of the person who called: PETTY    What was the call regarding: PATIENT ADVISED THAT DR GONZALEZ HAS ORDERED A COUPLE PROCEDURES AND NEEDS MORE INFORMATION ON THESE AS WELL WILL SHE NEED SOMEONE TO BE WITH HER; SHE IS OUT OF TOWN UNTIL THUR    SHE ALSO WANTS TO KNOW THE LOCATIONS OF THESE PROCEDURES    Is it okay if the provider responds through MyChart: YES

## 2023-06-02 NOTE — TELEPHONE ENCOUNTER
Reached patient at   Told her I did not call her back before today as message said she was out of town until yesterday. She is scheduled for tests and I told her that colonoscopy, EGD, gastro office visits take a bit longer to schedule-she stated she may not do the colonscopy as she is 82 and knows those can be dangerous.  She stated she will do EGD and office visit-she does not need anything further from me and has talked to Dr Dumont and has all of her questions answered    See referrals for any further communications

## 2023-07-11 ENCOUNTER — PREP FOR SURGERY (OUTPATIENT)
Dept: GASTROENTEROLOGY | Facility: CLINIC | Age: 83
End: 2023-07-11

## 2023-07-11 DIAGNOSIS — D50.9 IRON DEFICIENCY ANEMIA, UNSPECIFIED IRON DEFICIENCY ANEMIA TYPE: Primary | ICD-10-CM

## 2023-09-21 ENCOUNTER — OFFICE VISIT (OUTPATIENT)
Dept: INTERNAL MEDICINE | Facility: CLINIC | Age: 83
End: 2023-09-21
Payer: MEDICARE

## 2023-09-21 VITALS
DIASTOLIC BLOOD PRESSURE: 84 MMHG | HEART RATE: 76 BPM | TEMPERATURE: 98.6 F | SYSTOLIC BLOOD PRESSURE: 142 MMHG | RESPIRATION RATE: 18 BRPM | BODY MASS INDEX: 27.87 KG/M2 | WEIGHT: 138 LBS

## 2023-09-21 DIAGNOSIS — E78.2 MIXED HYPERLIPIDEMIA: Primary | ICD-10-CM

## 2023-09-21 DIAGNOSIS — D50.9 IRON DEFICIENCY ANEMIA, UNSPECIFIED IRON DEFICIENCY ANEMIA TYPE: ICD-10-CM

## 2023-09-21 DIAGNOSIS — I10 ESSENTIAL HYPERTENSION: ICD-10-CM

## 2023-09-21 PROCEDURE — 3077F SYST BP >= 140 MM HG: CPT | Performed by: INTERNAL MEDICINE

## 2023-09-21 PROCEDURE — 36415 COLL VENOUS BLD VENIPUNCTURE: CPT | Performed by: INTERNAL MEDICINE

## 2023-09-21 PROCEDURE — 3079F DIAST BP 80-89 MM HG: CPT | Performed by: INTERNAL MEDICINE

## 2023-09-21 PROCEDURE — 99214 OFFICE O/P EST MOD 30 MIN: CPT | Performed by: INTERNAL MEDICINE

## 2023-09-21 NOTE — PROGRESS NOTES
Chief Complaint   Patient presents with    Follow-up     Follow up chronic medical problems       History of Present Illness    The patient presents for a follow-up related to hypertension. The patient reports that she has had no headaches, chest pain, dyspnea, edema, syncope, blurred vision or palpitations. She states that she is taking her medication as prescribed. She is not having medication side effects.    The patient presents for a follow-up related to hyperlipidemia. She is following a low fat diet. She reports that she is exercising. She is taking pravastatin. The patient is taking her medication as prescribed. She reports no medication side effects, including muscle cramps, abdominal pain, headaches or weakness. She denies orthopnea, paroxysmal nocturnal dyspnea or dyspnea on exertion.    The patient presents for a follow-up related to iron deficiency anemia. There are no reports of blood loss. The patient has no symptoms of a dry cough, a wet cough, wheezing, fever, nausea, vomiting, diarrhea, myalgias, abdominal pain, sweats, weight loss, decreased appetite, chills, fatigue or paresthesias. The patient's energy level is normal.    Medications      Current Outpatient Medications:     amLODIPine (NORVASC) 2.5 MG tablet, Take 1 tablet by mouth Daily., Disp: 90 tablet, Rfl: 3    Cholecalciferol (VITAMIN D3 PO), Take 2,000 Units by mouth Daily., Disp: , Rfl:     Cyanocobalamin (VITAMIN B 12 PO), Take 1 tablet by mouth Daily., Disp: , Rfl:     Ferrous Sulfate ER 50 MG tablet controlled-release, Take 1 tablet by mouth Daily., Disp: , Rfl:     gabapentin (NEURONTIN) 100 MG capsule, Take 2 capsules by mouth 3 (Three) Times a Day., Disp: 180 capsule, Rfl: 2    ibuprofen (ADVIL,MOTRIN) 200 MG tablet, Take 3 tablets by mouth Every 8 (Eight) Hours As Needed., Disp: , Rfl:     levothyroxine (SYNTHROID, LEVOTHROID) 75 MCG tablet, Take 1 tablet by mouth Daily., Disp: 90 tablet, Rfl: 3    Melatonin 10 MG capsule, Take   by mouth Every Night., Disp: , Rfl:     omeprazole (priLOSEC) 20 MG capsule, Take 1 capsule by mouth Daily., Disp: 90 capsule, Rfl: 3    pravastatin (PRAVACHOL) 20 MG tablet, Take 1 tablet by mouth Every Night., Disp: 90 tablet, Rfl: 3    Probiotic Product (PROBIOTIC ADVANCED PO), Take  by mouth Daily., Disp: , Rfl:     ursodiol (ACTIGALL) 500 MG tablet, Take 1 tablet by mouth 2 (Two) Times a Day With Meals., Disp: 180 tablet, Rfl: 3     Allergies    Allergies   Allergen Reactions    Cefadroxil Other (See Comments)     C-DIFF    Clindamycin/Lincomycin Other (See Comments)     C-DIFF    Oxycodone GI Intolerance     Nausea and vomiting       Problem List    Patient Active Problem List   Diagnosis    Acquired hypothyroidism    Primary hypertension    Mixed hyperlipidemia    Varicose veins of lower extremity    Gastroesophageal reflux disease with esophagitis without hemorrhage    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    Physical Examination    /84 (BP Location: Left arm, Patient Position: Sitting, Cuff Size: Adult)   Pulse 76   Temp 98.6 °F (37 °C) (Infrared)   Resp 18   Wt 62.6 kg (138 lb)   LMP  (LMP Unknown)   BMI 27.87 kg/m²       HEENT: Head- Normocephalic Atraumatic. Facies- Within normal limits. Pinnas- Normal texture and shape bilaterally. Canals- Normal bilaterally. TMs- Normal bilaterally. Nares- Patent bilaterally. Nasal Septum- is normal. There is no tenderness to palpation over the frontal or maxillary sinuses. Lids- Normal bilaterally. Conjunctiva- Clear bilaterally. Sclera- Anicteric bilaterally. Oropharynx- Moist with no lesions. Tonsils- No enlargement, erythema or exudate.    Neck: Thyroid- non enlarged, symmetric and has no nodules. No bruits are detected. ROM- Normal Range of Motion with no rigidity.    Lungs: Auscultation- Clear to auscultation bilaterally. There are no retractions,  clubbing or cyanosis. The Expiratory to Inspiratory ratio is equal.    Lymph Nodes: Cervical- no enlarged lymph nodes noted.    Cardiovascular: There are no carotid bruits. Heart- Normal Rate with Regular rhythm and no murmurs. There are no gallops. There are no rubs. In the lower extremities there is no edema. The upper extremities do not have edema.    Abdomen: Soft, benign, non-tender with no masses, hernias, organomegaly or scars.    Impression and Assessment    Essential Hypertension.    Hyperlipidemia.    Iron Deficiency Anemia.    Plan    Essential Hypertension Plan: The patient was instructed to continue the current medications.    Hyperlipidemia Plan: The patient was instructed to exercise daily, eat a low fat diet and continue her medications.    Iron Deficiency Anemia Plan: Further plans will be made after results of tests are available.    Diagnoses and all orders for this visit:    1. Mixed hyperlipidemia (Primary)  -     Comprehensive Metabolic Panel; Future  -     Lipid Panel; Future  -     Lipid Panel  -     Comprehensive Metabolic Panel    2. Iron deficiency anemia, unspecified iron deficiency anemia type  -     CBC & Differential; Future  -     Ferritin; Future  -     Iron Profile; Future  -     Iron Profile  -     Ferritin  -     CBC & Differential    3. Essential hypertension  -     Comprehensive Metabolic Panel; Future  -     Comprehensive Metabolic Panel        Return to Office    The patient was instructed to return for follow-up in 3 months. The patient was instructed to return sooner if the condition changes, worsens, or does not resolve.        Left arm venipuncture good hemostasis

## 2023-09-22 LAB
ALBUMIN SERPL-MCNC: 4.2 G/DL (ref 3.5–5.2)
ALBUMIN/GLOB SERPL: 1.4 G/DL
ALP SERPL-CCNC: 87 U/L (ref 39–117)
ALT SERPL-CCNC: 16 U/L (ref 1–33)
AST SERPL-CCNC: 31 U/L (ref 1–32)
BASOPHILS # BLD AUTO: 0.04 10*3/MM3 (ref 0–0.2)
BASOPHILS NFR BLD AUTO: 1.1 % (ref 0–1.5)
BILIRUB SERPL-MCNC: 0.5 MG/DL (ref 0–1.2)
BUN SERPL-MCNC: 9 MG/DL (ref 8–23)
BUN/CREAT SERPL: 16.7 (ref 7–25)
CALCIUM SERPL-MCNC: 9.7 MG/DL (ref 8.6–10.5)
CHLORIDE SERPL-SCNC: 100 MMOL/L (ref 98–107)
CHOLEST SERPL-MCNC: 155 MG/DL (ref 0–200)
CO2 SERPL-SCNC: 26.4 MMOL/L (ref 22–29)
CREAT SERPL-MCNC: 0.54 MG/DL (ref 0.57–1)
EGFRCR SERPLBLD CKD-EPI 2021: 91.5 ML/MIN/1.73
EOSINOPHIL # BLD AUTO: 0.11 10*3/MM3 (ref 0–0.4)
EOSINOPHIL NFR BLD AUTO: 2.9 % (ref 0.3–6.2)
ERYTHROCYTE [DISTWIDTH] IN BLOOD BY AUTOMATED COUNT: 21.6 % (ref 12.3–15.4)
FERRITIN SERPL-MCNC: 43 NG/ML (ref 13–150)
GLOBULIN SER CALC-MCNC: 3.1 GM/DL
GLUCOSE SERPL-MCNC: 99 MG/DL (ref 65–99)
HCT VFR BLD AUTO: 36.4 % (ref 34–46.6)
HDLC SERPL-MCNC: 75 MG/DL (ref 40–60)
HGB BLD-MCNC: 11.3 G/DL (ref 12–15.9)
IMM GRANULOCYTES # BLD AUTO: 0.02 10*3/MM3 (ref 0–0.05)
IMM GRANULOCYTES NFR BLD AUTO: 0.5 % (ref 0–0.5)
IRON SATN MFR SERPL: 9 % (ref 20–50)
IRON SERPL-MCNC: 41 MCG/DL (ref 37–145)
LDLC SERPL CALC-MCNC: 69 MG/DL (ref 0–100)
LYMPHOCYTES # BLD AUTO: 1.51 10*3/MM3 (ref 0.7–3.1)
LYMPHOCYTES NFR BLD AUTO: 40.5 % (ref 19.6–45.3)
MCH RBC QN AUTO: 25.1 PG (ref 26.6–33)
MCHC RBC AUTO-ENTMCNC: 31 G/DL (ref 31.5–35.7)
MCV RBC AUTO: 80.7 FL (ref 79–97)
MONOCYTES # BLD AUTO: 0.26 10*3/MM3 (ref 0.1–0.9)
MONOCYTES NFR BLD AUTO: 7 % (ref 5–12)
NEUTROPHILS # BLD AUTO: 1.79 10*3/MM3 (ref 1.7–7)
NEUTROPHILS NFR BLD AUTO: 48 % (ref 42.7–76)
PLATELET # BLD AUTO: 239 10*3/MM3 (ref 140–450)
POTASSIUM SERPL-SCNC: 5.9 MMOL/L (ref 3.5–5.2)
PROT SERPL-MCNC: 7.3 G/DL (ref 6–8.5)
RBC # BLD AUTO: 4.51 10*6/MM3 (ref 3.77–5.28)
SODIUM SERPL-SCNC: 137 MMOL/L (ref 136–145)
TIBC SERPL-MCNC: 480 MCG/DL
TRIGL SERPL-MCNC: 53 MG/DL (ref 0–150)
UIBC SERPL-MCNC: 439 MCG/DL (ref 112–346)
VLDLC SERPL CALC-MCNC: 11 MG/DL (ref 5–40)
WBC # BLD AUTO: 3.73 10*3/MM3 (ref 3.4–10.8)

## 2023-10-09 DIAGNOSIS — G89.29 CHRONIC MIDLINE LOW BACK PAIN WITHOUT SCIATICA: ICD-10-CM

## 2023-10-09 DIAGNOSIS — E03.9 ACQUIRED HYPOTHYROIDISM: ICD-10-CM

## 2023-10-09 DIAGNOSIS — M54.50 CHRONIC MIDLINE LOW BACK PAIN WITHOUT SCIATICA: ICD-10-CM

## 2023-10-09 RX ORDER — GABAPENTIN 100 MG/1
200 CAPSULE ORAL 3 TIMES DAILY
Qty: 180 CAPSULE | Refills: 1 | Status: SHIPPED | OUTPATIENT
Start: 2023-10-09

## 2023-10-09 RX ORDER — LEVOTHYROXINE SODIUM 0.07 MG/1
TABLET ORAL
Qty: 90 TABLET | Refills: 10 | Status: SHIPPED | OUTPATIENT
Start: 2023-10-09

## 2024-01-04 DIAGNOSIS — M54.50 CHRONIC MIDLINE LOW BACK PAIN WITHOUT SCIATICA: ICD-10-CM

## 2024-01-04 DIAGNOSIS — G89.29 CHRONIC MIDLINE LOW BACK PAIN WITHOUT SCIATICA: ICD-10-CM

## 2024-01-04 RX ORDER — GABAPENTIN 100 MG/1
200 CAPSULE ORAL 3 TIMES DAILY
Qty: 180 CAPSULE | Refills: 0 | Status: SHIPPED | OUTPATIENT
Start: 2024-01-04

## 2024-01-22 ENCOUNTER — OFFICE VISIT (OUTPATIENT)
Dept: INTERNAL MEDICINE | Facility: CLINIC | Age: 84
End: 2024-01-22
Payer: MEDICARE

## 2024-01-22 VITALS
HEART RATE: 66 BPM | RESPIRATION RATE: 16 BRPM | BODY MASS INDEX: 27.83 KG/M2 | SYSTOLIC BLOOD PRESSURE: 148 MMHG | TEMPERATURE: 98.7 F | DIASTOLIC BLOOD PRESSURE: 76 MMHG | WEIGHT: 137.8 LBS

## 2024-01-22 DIAGNOSIS — I10 ESSENTIAL HYPERTENSION: ICD-10-CM

## 2024-01-22 DIAGNOSIS — K21.00 GASTROESOPHAGEAL REFLUX DISEASE WITH ESOPHAGITIS WITHOUT HEMORRHAGE: ICD-10-CM

## 2024-01-22 DIAGNOSIS — E03.9 ACQUIRED HYPOTHYROIDISM: ICD-10-CM

## 2024-01-22 DIAGNOSIS — E78.2 MIXED HYPERLIPIDEMIA: ICD-10-CM

## 2024-01-22 DIAGNOSIS — Z79.899 HIGH RISK MEDICATION USE: Primary | ICD-10-CM

## 2024-01-22 DIAGNOSIS — D50.9 IRON DEFICIENCY ANEMIA, UNSPECIFIED IRON DEFICIENCY ANEMIA TYPE: ICD-10-CM

## 2024-01-22 DIAGNOSIS — M54.50 CHRONIC MIDLINE LOW BACK PAIN WITHOUT SCIATICA: ICD-10-CM

## 2024-01-22 DIAGNOSIS — G89.29 CHRONIC MIDLINE LOW BACK PAIN WITHOUT SCIATICA: ICD-10-CM

## 2024-01-22 PROCEDURE — 99214 OFFICE O/P EST MOD 30 MIN: CPT | Performed by: INTERNAL MEDICINE

## 2024-01-22 PROCEDURE — 3078F DIAST BP <80 MM HG: CPT | Performed by: INTERNAL MEDICINE

## 2024-01-22 PROCEDURE — 3077F SYST BP >= 140 MM HG: CPT | Performed by: INTERNAL MEDICINE

## 2024-01-22 RX ORDER — GABAPENTIN 300 MG/1
300 CAPSULE ORAL 3 TIMES DAILY
Qty: 90 CAPSULE | Refills: 2 | Status: SHIPPED | OUTPATIENT
Start: 2024-01-22

## 2024-01-22 NOTE — PROGRESS NOTES
Chief Complaint   Patient presents with    Follow-up     4 month follow up for chronic medical conditions       History of Present Illness      The patient presents for a follow-up related to hyperlipidemia. She is following a low fat diet. She reports that she is exercising. She is taking pravastatin. The patient is taking her medication as prescribed. She reports no medication side effects, including muscle cramps, abdominal pain, headaches or weakness. She denies chest pain, shortness of breath, orthopnea, paroxysmal nocturnal dyspnea, dyspnea on exertion, edema, palpitations or syncope.    The patient presents for a follow-up related to hypertension. The patient reports that she has had no headaches or blurred vision. She states that she is taking her medication as prescribed. She is not having medication side effects.    The patient presents for a follow-up related to hypothyroidism. She reports a good energy level. She reports no hair loss, heat intolerance, cold intolerance, diarrhea, constipation or sweats. She is taking her medication as prescribed.    The patient presents for a follow-up related to GERD. The patient is on omeprazole for her gastroesophageal reflux. The medication is taken on a regular basis and gives complete relief of the symptoms. She reports no abdominal pain, belching, dysphagia, early satiety, heartburn, hoarseness, nausea, odynophagia, rectal bleeding, vomiting or weight loss. The GERD has no known aggravating factors.    The patient presents for follow-up of low back pain. She reports that the pain is stable. The current treatment consists of no specific therapies.  She does not have associated fever, chills, weight loss, rashes, dysuria, hematuria, or a decreased urine stream. The pain does not radiate. There is no numbness. The patient denies loss of bladder control. The patient denies loss of bowel control. The patient has attempted hot packs. The heat did give pain relief. The  patient reports no aggravating factors.    The patient presents for a follow-up related to iron deficiency anemia. There are no reports of blood loss. The patient has no symptoms of a dry cough, a wet cough, wheezing, fever, myalgias, sweats, decreased appetite, chills or paresthesias. The patient's energy level is normal.    Medications      Current Outpatient Medications:     amLODIPine (NORVASC) 2.5 MG tablet, Take 1 tablet by mouth Daily., Disp: 90 tablet, Rfl: 3    Cholecalciferol (VITAMIN D3 PO), Take 2,000 Units by mouth Daily., Disp: , Rfl:     Cyanocobalamin (VITAMIN B 12 PO), Take 1 tablet by mouth Daily., Disp: , Rfl:     Ferrous Sulfate ER 50 MG tablet controlled-release, Take 1 tablet by mouth Daily., Disp: , Rfl:     gabapentin (NEURONTIN) 100 MG capsule, TAKE 2 CAPSULES THREE TIMES DAILY, Disp: 180 capsule, Rfl: 0    ibuprofen (ADVIL,MOTRIN) 200 MG tablet, Take 3 tablets by mouth Every 8 (Eight) Hours As Needed., Disp: , Rfl:     levothyroxine (SYNTHROID, LEVOTHROID) 75 MCG tablet, TAKE 1 TABLET EVERY DAY (DOSE CHANGE), Disp: 90 tablet, Rfl: 10    Melatonin 10 MG capsule, Take  by mouth Every Night., Disp: , Rfl:     omeprazole (priLOSEC) 20 MG capsule, Take 1 capsule by mouth Daily., Disp: 90 capsule, Rfl: 3    pravastatin (PRAVACHOL) 20 MG tablet, Take 1 tablet by mouth Every Night., Disp: 90 tablet, Rfl: 3    Probiotic Product (PROBIOTIC ADVANCED PO), Take  by mouth Daily., Disp: , Rfl:     ursodiol (ACTIGALL) 500 MG tablet, Take 1 tablet by mouth 2 (Two) Times a Day With Meals., Disp: 180 tablet, Rfl: 3     Allergies    Allergies   Allergen Reactions    Cefadroxil Other (See Comments)     C-DIFF    Clindamycin/Lincomycin Other (See Comments)     C-DIFF    Oxycodone GI Intolerance     Nausea and vomiting       Problem List    Patient Active Problem List   Diagnosis    Acquired hypothyroidism    Primary hypertension    Mixed hyperlipidemia    Varicose veins of lower extremity    Gastroesophageal  reflux disease with esophagitis without hemorrhage    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    Physical Examination    /76 (BP Location: Left arm, Patient Position: Sitting, Cuff Size: Adult)   Pulse 66   Temp 98.7 °F (37.1 °C) (Infrared)   Resp 16   Wt 62.5 kg (137 lb 12.8 oz)   LMP  (LMP Unknown)   BMI 27.83 kg/m²       HEENT: Facies- Within normal limits. Lids- Normal bilaterally. Conjunctiva- Clear bilaterally. Sclera- Anicteric bilaterally.    Neck: Thyroid- non enlarged, symmetric and has no nodules. No bruits are detected. ROM- Normal Range of Motion with no rigidity.    Lungs: Auscultation- Clear to auscultation bilaterally. There are no retractions, clubbing or cyanosis. The Expiratory to Inspiratory ratio is equal.    Cardiovascular: There are no carotid bruits. Heart- Normal Rate with Regular rhythm and no murmurs. There are no gallops. There are no rubs. In the lower extremities there is no edema. The upper extremities do not have edema.    Abdomen: Soft, benign, non-tender with no masses, hernias, organomegaly or scars.    Back: The patient has a normal spinal curvature with no evidence of scoliosis. There are no skin lesions noted on the back. Palpation reveals no tenderness and normal muscle tone. The straight leg raising test is negative. The back has normal flexion, extension, rotation, and lateral bending.    Impression and Assessment    Hyperlipidemia.    Essential Hypertension.    Hypothyroidism.    Gastroesophageal Reflux Disease.    Low Back Pain.    Iron Deficiency Anemia.    Plan    Gastroesophageal Reflux Disease Plan: The patient was instructed to continue the current medications.    Hyperlipidemia Plan: The patient was instructed to exercise daily, eat a low fat diet and continue her medications.    Essential Hypertension Plan: The patient was instructed to continue the  current medications.    Hypothyroidism Plan: The patient was instructed to continue the current medications.    Low Back Pain Plan: The medications were adjusted as noted.    Iron Deficiency Anemia Plan: The patient was instructed to continue the current medications.    Diagnoses and all orders for this visit:    1. High risk medication use (Primary)  -     ToxAssure Flex 23, Ur -; Future  -     ToxAssure Flex 23, Ur -    2. Essential hypertension  -     Comprehensive Metabolic Panel; Future    3. Acquired hypothyroidism  -     TSH; Future  -     T4, Free; Future    4. Mixed hyperlipidemia  -     Comprehensive Metabolic Panel; Future    5. Gastroesophageal reflux disease with esophagitis without hemorrhage    6. Iron deficiency anemia, unspecified iron deficiency anemia type  -     CBC & Differential; Future    7. Chronic midline low back pain without sciatica  -     gabapentin (NEURONTIN) 300 MG capsule; Take 1 capsule by mouth 3 (Three) Times a Day.  Dispense: 90 capsule; Refill: 2        Return to Office    The patient was instructed to return for follow-up in 4 months. The patient was instructed to return sooner if the condition changes, worsens, or does not resolve.

## 2024-01-23 LAB
ALBUMIN SERPL-MCNC: 4.1 G/DL (ref 3.5–5.2)
ALBUMIN/GLOB SERPL: 1.4 G/DL
ALP SERPL-CCNC: 93 U/L (ref 39–117)
ALT SERPL-CCNC: 20 U/L (ref 1–33)
AST SERPL-CCNC: 28 U/L (ref 1–32)
BASOPHILS # BLD AUTO: 0.02 10*3/MM3 (ref 0–0.2)
BASOPHILS NFR BLD AUTO: 0.6 % (ref 0–1.5)
BILIRUB SERPL-MCNC: 0.7 MG/DL (ref 0–1.2)
BUN SERPL-MCNC: 6 MG/DL (ref 8–23)
BUN/CREAT SERPL: 10.5 (ref 7–25)
CALCIUM SERPL-MCNC: 9.2 MG/DL (ref 8.6–10.5)
CHLORIDE SERPL-SCNC: 101 MMOL/L (ref 98–107)
CO2 SERPL-SCNC: 27.4 MMOL/L (ref 22–29)
CREAT SERPL-MCNC: 0.57 MG/DL (ref 0.57–1)
EGFRCR SERPLBLD CKD-EPI 2021: 90.3 ML/MIN/1.73
EOSINOPHIL # BLD AUTO: 0.07 10*3/MM3 (ref 0–0.4)
EOSINOPHIL NFR BLD AUTO: 2 % (ref 0.3–6.2)
ERYTHROCYTE [DISTWIDTH] IN BLOOD BY AUTOMATED COUNT: 12.8 % (ref 12.3–15.4)
GLOBULIN SER CALC-MCNC: 3 GM/DL
GLUCOSE SERPL-MCNC: 96 MG/DL (ref 65–99)
HCT VFR BLD AUTO: 42.1 % (ref 34–46.6)
HGB BLD-MCNC: 14.4 G/DL (ref 12–15.9)
IMM GRANULOCYTES # BLD AUTO: 0.01 10*3/MM3 (ref 0–0.05)
IMM GRANULOCYTES NFR BLD AUTO: 0.3 % (ref 0–0.5)
LYMPHOCYTES # BLD AUTO: 1.39 10*3/MM3 (ref 0.7–3.1)
LYMPHOCYTES NFR BLD AUTO: 39.5 % (ref 19.6–45.3)
MCH RBC QN AUTO: 33.1 PG (ref 26.6–33)
MCHC RBC AUTO-ENTMCNC: 34.2 G/DL (ref 31.5–35.7)
MCV RBC AUTO: 96.8 FL (ref 79–97)
MONOCYTES # BLD AUTO: 0.23 10*3/MM3 (ref 0.1–0.9)
MONOCYTES NFR BLD AUTO: 6.5 % (ref 5–12)
NEUTROPHILS # BLD AUTO: 1.8 10*3/MM3 (ref 1.7–7)
NEUTROPHILS NFR BLD AUTO: 51.1 % (ref 42.7–76)
NRBC BLD AUTO-RTO: 0 /100 WBC (ref 0–0.2)
PLATELET # BLD AUTO: 151 10*3/MM3 (ref 140–450)
POTASSIUM SERPL-SCNC: 4.3 MMOL/L (ref 3.5–5.2)
PROT SERPL-MCNC: 7.1 G/DL (ref 6–8.5)
RBC # BLD AUTO: 4.35 10*6/MM3 (ref 3.77–5.28)
SODIUM SERPL-SCNC: 137 MMOL/L (ref 136–145)
T4 FREE SERPL-MCNC: 1.37 NG/DL (ref 0.93–1.7)
TSH SERPL DL<=0.005 MIU/L-ACNC: 3.21 UIU/ML (ref 0.27–4.2)
WBC # BLD AUTO: 3.52 10*3/MM3 (ref 3.4–10.8)

## 2024-01-26 LAB
1OH-MIDAZOLAM UR QL SCN: NOT DETECTED NG/MG CREAT
6MAM UR QL SCN: NEGATIVE NG/ML
7AMINOCLONAZEPAM/CREAT UR: NOT DETECTED NG/MG CREAT
A-OH ALPRAZ/CREAT UR: NOT DETECTED NG/MG CREAT
A-OH-TRIAZOLAM/CREAT UR CFM: NOT DETECTED NG/MG CREAT
ACP UR QL CFM: NOT DETECTED
ALPRAZ/CREAT UR CFM: NOT DETECTED NG/MG CREAT
AMPHETAMINES UR QL SCN: NEGATIVE NG/ML
APAP UR QL SCN: NEGATIVE UG/ML
BARBITURATES UR QL SCN: NEGATIVE NG/ML
BENZODIAZ SCN METH UR: NEGATIVE
BUPRENORPHINE UR QL SCN: NEGATIVE
BUPRENORPHINE/CREAT UR: NOT DETECTED NG/MG CREAT
CANNABINOIDS UR QL SCN: NEGATIVE NG/ML
CARISOPRODOL UR QL: NEGATIVE NG/ML
CLONAZEPAM/CREAT UR CFM: NOT DETECTED NG/MG CREAT
COCAINE+BZE UR QL SCN: NEGATIVE NG/ML
CREAT UR-MCNC: 38 MG/DL
D-METHORPHAN UR-MCNC: NOT DETECTED NG/ML
D-METHORPHAN+LEVORPHANOL UR QL: NOT DETECTED
DESALKYLFLURAZ/CREAT UR: NOT DETECTED NG/MG CREAT
DIAZEPAM/CREAT UR: NOT DETECTED NG/MG CREAT
ETHANOL UR QL SCN: NEGATIVE G/DL
ETHANOL UR QL SCN: NEGATIVE NG/ML
FENTANYL CTO UR SCN-MCNC: NEGATIVE NG/ML
FENTANYL/CREAT UR: NOT DETECTED NG/MG CREAT
FLUNITRAZEPAM UR QL SCN: NOT DETECTED NG/MG CREAT
GABAPENTIN UR CFM-MCNC: PRESENT NG/ML
GABAPENTIN UR QL CFM: NORMAL
GABAPENTIN UR-MCNC: NORMAL UG/ML
HYPNOTICS UR QL SCN: NEGATIVE
KETAMINE UR QL: NOT DETECTED
LORAZEPAM/CREAT UR: NOT DETECTED NG/MG CREAT
MEPERIDINE UR QL SCN: NEGATIVE NG/ML
METHADONE UR QL SCN: NEGATIVE NG/ML
METHADONE+METAB UR QL SCN: NEGATIVE NG/ML
MIDAZOLAM/CREAT UR CFM: NOT DETECTED NG/MG CREAT
MISCELLANEOUS, UR: NEGATIVE
NORBUPRENORPHINE/CREAT UR: NOT DETECTED NG/MG CREAT
NORDIAZEPAM/CREAT UR: NOT DETECTED NG/MG CREAT
NORFENTANYL/CREAT UR: NOT DETECTED NG/MG CREAT
NORFLUNITRAZEPAM UR-MCNC: NOT DETECTED NG/MG CREAT
NORKETAMINE UR-MCNC: NOT DETECTED UG/ML
OPIATES UR SCN-MCNC: NEGATIVE NG/ML
OTHER HALLUCINOGENS UR: NEGATIVE
OXAZEPAM/CREAT UR: NOT DETECTED NG/MG CREAT
OXYCODONE CTO UR SCN-MCNC: NEGATIVE NG/ML
PCP UR QL SCN: NEGATIVE NG/ML
PRESCRIBED MEDICATIONS: NORMAL
PROPOXYPH UR QL SCN: NEGATIVE NG/ML
TAPENTADOL CTO UR SCN-MCNC: NEGATIVE NG/ML
TEMAZEPAM/CREAT UR: NOT DETECTED NG/MG CREAT
TRAMADOL UR QL SCN: NEGATIVE NG/ML
ZALEPLON UR-MCNC: NOT DETECTED NG/ML
ZOLPIDEM PHENYL-4-CARB UR QL SCN: NOT DETECTED
ZOLPIDEM UR QL SCN: NOT DETECTED
ZOPICLONE-N-OXIDE UR-MCNC: NOT DETECTED NG/ML

## 2024-04-09 DIAGNOSIS — M54.50 CHRONIC MIDLINE LOW BACK PAIN WITHOUT SCIATICA: ICD-10-CM

## 2024-04-09 DIAGNOSIS — G89.29 CHRONIC MIDLINE LOW BACK PAIN WITHOUT SCIATICA: ICD-10-CM

## 2024-04-09 RX ORDER — GABAPENTIN 300 MG/1
300 CAPSULE ORAL 3 TIMES DAILY
Qty: 90 CAPSULE | Refills: 2 | Status: SHIPPED | OUTPATIENT
Start: 2024-04-09

## 2024-04-09 NOTE — TELEPHONE ENCOUNTER
Caller: Petty Artis MEHRAN    Relationship: Self    Best call back number: [phone]    Requested Prescriptions:   Requested Prescriptions     Pending Prescriptions Disp Refills    gabapentin (NEURONTIN) 300 MG capsule 90 capsule 2     Sig: Take 1 capsule by mouth 3 (Three) Times a Day.        Pharmacy where request should be sent:  [facility] - [facility] [address] - [phone]  - [phone]       Last office visit with prescribing clinician: 1/22/2024   Last telemedicine visit with prescribing clinician: Visit date not found   Next office visit with prescribing clinician: 5/20/2024     Additional details provided by patient: REQUESTING TO BE SENT IN ONE WEEK; PATIENT IS OUT OF TOWN    Does the patient have less than a 3 day supply:  [] Yes  [x] No    Would you like a call back once the refill request has been completed: [x] Yes [] No    If the office needs to give you a call back, can they leave a voicemail: [x] Yes [] No    Ranjan Ordonez Rep   04/09/24 11:42 EDT

## 2024-04-20 DIAGNOSIS — K74.3 HEPATIC CIRRHOSIS DUE TO PRIMARY BILIARY CHOLANGITIS: ICD-10-CM

## 2024-04-22 RX ORDER — URSODIOL 500 MG/1
500 TABLET, FILM COATED ORAL 2 TIMES DAILY WITH MEALS
Qty: 180 TABLET | Refills: 3 | Status: SHIPPED | OUTPATIENT
Start: 2024-04-22 | End: 2024-04-29 | Stop reason: SDUPTHER

## 2024-04-29 DIAGNOSIS — M54.50 CHRONIC MIDLINE LOW BACK PAIN WITHOUT SCIATICA: ICD-10-CM

## 2024-04-29 DIAGNOSIS — G89.29 CHRONIC MIDLINE LOW BACK PAIN WITHOUT SCIATICA: ICD-10-CM

## 2024-04-29 DIAGNOSIS — K74.3 HEPATIC CIRRHOSIS DUE TO PRIMARY BILIARY CHOLANGITIS: ICD-10-CM

## 2024-04-29 RX ORDER — URSODIOL 500 MG/1
500 TABLET, FILM COATED ORAL 2 TIMES DAILY WITH MEALS
Qty: 180 TABLET | Refills: 3 | Status: SHIPPED | OUTPATIENT
Start: 2024-04-29 | End: 2025-04-29

## 2024-04-29 RX ORDER — GABAPENTIN 300 MG/1
300 CAPSULE ORAL 3 TIMES DAILY
Qty: 90 CAPSULE | Refills: 2 | Status: SHIPPED | OUTPATIENT
Start: 2024-04-29

## 2024-04-29 NOTE — TELEPHONE ENCOUNTER
Last office visit (LOV) for chronic conditions 01/22/24  Next office visit (NOV) 05/20/24  Urine drug screen (UDS) 01/22/24  Controlled substance agreement (CSA) 01/22/24

## 2024-04-29 NOTE — TELEPHONE ENCOUNTER
Caller: Petty Artis    Relationship: Self    Best call back number: [phone]     Requested Prescriptions:   Requested Prescriptions     Pending Prescriptions Disp Refills    gabapentin (NEURONTIN) 300 MG capsule 90 capsule 2     Sig: Take 1 capsule by mouth 3 (Three) Times a Day.    ursodiol (ACTIGALL) 500 MG tablet 180 tablet 3     Sig: Take 1 tablet by mouth 2 (Two) Times a Day With Meals.        Pharmacy where request should be sent: [facility] - [place] [id number] [facility] - [phone]  - [phone]      Last office visit with prescribing clinician: 1/22/2024   Last telemedicine visit with prescribing clinician: Visit date not found   Next office visit with prescribing clinician: 5/20/2024     Additional details provided by patient: ASK PHARMACY TO SEND TO HOME ADDRESS    Does the patient have less than a 3 day supply:  [] Yes  [x] No    Would you like a call back once the refill request has been completed: [] Yes [x] No    If the office needs to give you a call back, can they leave a voicemail: [] Yes [x] No    Ranjan Rodriguez Rep   04/29/24 09:58 EDT

## 2024-05-20 ENCOUNTER — OFFICE VISIT (OUTPATIENT)
Dept: INTERNAL MEDICINE | Facility: CLINIC | Age: 84
End: 2024-05-20
Payer: MEDICARE

## 2024-05-20 VITALS
SYSTOLIC BLOOD PRESSURE: 132 MMHG | RESPIRATION RATE: 18 BRPM | BODY MASS INDEX: 28.22 KG/M2 | WEIGHT: 140 LBS | HEIGHT: 59 IN | HEART RATE: 76 BPM | DIASTOLIC BLOOD PRESSURE: 70 MMHG | TEMPERATURE: 98.6 F

## 2024-05-20 DIAGNOSIS — R82.998 LEUKOCYTES IN URINE: ICD-10-CM

## 2024-05-20 DIAGNOSIS — E78.2 MIXED HYPERLIPIDEMIA: ICD-10-CM

## 2024-05-20 DIAGNOSIS — Z00.00 MEDICARE ANNUAL WELLNESS VISIT, SUBSEQUENT: Primary | ICD-10-CM

## 2024-05-20 DIAGNOSIS — I10 ESSENTIAL HYPERTENSION: ICD-10-CM

## 2024-05-20 DIAGNOSIS — K21.00 GASTROESOPHAGEAL REFLUX DISEASE WITH ESOPHAGITIS WITHOUT HEMORRHAGE: ICD-10-CM

## 2024-05-20 DIAGNOSIS — D50.9 IRON DEFICIENCY ANEMIA, UNSPECIFIED IRON DEFICIENCY ANEMIA TYPE: ICD-10-CM

## 2024-05-20 DIAGNOSIS — R31.9 HEMATURIA, UNSPECIFIED TYPE: ICD-10-CM

## 2024-05-20 DIAGNOSIS — E03.9 ACQUIRED HYPOTHYROIDISM: ICD-10-CM

## 2024-05-20 DIAGNOSIS — Z00.00 PHYSICAL EXAM: ICD-10-CM

## 2024-05-20 LAB
BILIRUB BLD-MCNC: NEGATIVE MG/DL
CLARITY, POC: ABNORMAL
COLOR UR: YELLOW
EXPIRATION DATE: ABNORMAL
GLUCOSE UR STRIP-MCNC: NEGATIVE MG/DL
KETONES UR QL: NEGATIVE
LEUKOCYTE EST, POC: ABNORMAL
Lab: ABNORMAL
NITRITE UR-MCNC: NEGATIVE MG/ML
PH UR: 6.5 [PH] (ref 5–8)
PROT UR STRIP-MCNC: NEGATIVE MG/DL
RBC # UR STRIP: ABNORMAL /UL
SP GR UR: 1 (ref 1–1.03)
UROBILINOGEN UR QL: NORMAL

## 2024-05-20 PROCEDURE — 99397 PER PM REEVAL EST PAT 65+ YR: CPT | Performed by: INTERNAL MEDICINE

## 2024-05-20 PROCEDURE — 36415 COLL VENOUS BLD VENIPUNCTURE: CPT | Performed by: INTERNAL MEDICINE

## 2024-05-20 PROCEDURE — 3078F DIAST BP <80 MM HG: CPT | Performed by: INTERNAL MEDICINE

## 2024-05-20 PROCEDURE — 81003 URINALYSIS AUTO W/O SCOPE: CPT | Performed by: INTERNAL MEDICINE

## 2024-05-20 PROCEDURE — 1170F FXNL STATUS ASSESSED: CPT | Performed by: INTERNAL MEDICINE

## 2024-05-20 PROCEDURE — G0439 PPPS, SUBSEQ VISIT: HCPCS | Performed by: INTERNAL MEDICINE

## 2024-05-20 PROCEDURE — 1159F MED LIST DOCD IN RCRD: CPT | Performed by: INTERNAL MEDICINE

## 2024-05-20 PROCEDURE — 96160 PT-FOCUSED HLTH RISK ASSMT: CPT | Performed by: INTERNAL MEDICINE

## 2024-05-20 PROCEDURE — 3075F SYST BP GE 130 - 139MM HG: CPT | Performed by: INTERNAL MEDICINE

## 2024-05-20 PROCEDURE — 1126F AMNT PAIN NOTED NONE PRSNT: CPT | Performed by: INTERNAL MEDICINE

## 2024-05-20 NOTE — PATIENT INSTRUCTIONS
Medicare Wellness  Personal Prevention Plan of Service     Date of Office Visit:    Encounter Provider:  Lopez Dumont MD  Place of Service:  [facility]  Patient Name: Petty Artis  :  1940    As part of the Medicare Wellness portion of your visit today, we are providing you with this personalized preventive plan of services (PPPS). This plan is based upon recommendations of the [place] Preventive Services Task Force (USPSTF) and the Advisory Committee on Immunization Practices (ACIP).    This lists the preventive care services that should be considered, and provides dates of when you are due. Items listed as completed are up-to-date and do not require any further intervention.    Health Maintenance   Topic Date Due    Hepatitis B (1 of 3 - Risk 3-dose series) Never done    RSV Vaccine - Adults (1 - 1-dose 60+ series) Never done    ZOSTER VACCINE (2 of 3) 2017    COVID-19 Vaccine ( - -24 season) 2023    DXA SCAN  2023    INFLUENZA VACCINE  2024    LIPID PANEL  2024    TDAP/TD VACCINES (2 - Td or Tdap) 2025    ANNUAL WELLNESS VISIT  2025    BMI FOLLOWUP  2025    Pneumococcal Vaccine 65+  Completed       Orders Placed This Encounter   Procedures    Comprehensive Metabolic Panel     Standing Status:   Future     Standing Expiration Date:   2025     Order Specific Question:   Release to patient     Answer:   Routine Release [[id number]]    Lipid Panel     Standing Status:   Future     Standing Expiration Date:   2025     Order Specific Question:   Release to patient     Answer:   Routine Release [[id number]]    Ferritin     Standing Status:   Future     Standing Expiration Date:   2025     Order Specific Question:   Release to patient     Answer:   Routine Release [[id number]]    Iron Profile     Standing Status:   Future     Standing Expiration Date:   2025     Order  Specific Question:   Release to patient     Answer:   Routine Release [[id number]]    TSH     Standing Status:   Future     Standing Expiration Date:   5/21/2025     Order Specific Question:   Release to patient     Answer:   Routine Release [[id number]]    T4, Free     Standing Status:   Future     Standing Expiration Date:   5/21/2025     Order Specific Question:   Release to patient     Answer:   Routine Release [[id number]]    POC Urinalysis Dipstick, Automated     Standing Status:   Future     Order Specific Question:   Release to patient     Answer:   Routine Release [[id number]]    CBC & Differential     Standing Status:   Future     Standing Expiration Date:   5/21/2025     Order Specific Question:   Manual Differential     Answer:   No     Order Specific Question:   Release to patient     Answer:   Routine Release [[id number]]       Return in about 6 months (around 11/20/2024) for Follow-up.          Advance Care Planning and Advance Directives     You make decisions on a daily basis - decisions about where you want to live, your career, your home, your life. Perhaps one of the most important decisions you face is your choice for future medical care. Take time to talk with your family and your healthcare team and start planning today.  Advance Care Planning is a process that can help you:  Understand possible future healthcare decisions in light of your own experiences  Reflect on those decision in light of your goals and values  Discuss your decisions with those closest to you and the healthcare professionals that care for you  Make a plan by creating a document that reflects your wishes    Surrogate Decision Maker  In the event of a medical emergency, which has left you unable to communicate or to make your own decisions, you would need someone to make decisions for you.  It is important to discuss your preferences for medical treatment with this person while you are in good health.     Qualities of a  surrogate decision maker:  Willing to take on this role and responsibility  Knows what you want for future medical care  Willing to follow your wishes even if they don't agree with them  Able to make difficult medical decisions under stressful circumstances    Advance Directives  These are legal documents you can create that will guide your healthcare team and decision maker(s) when needed. These documents can be stored in the electronic medical record.    Living Will - a legal document to guide your care if you have a terminal condition or a serious illness and are unable to communicate. States vary by statute in document names/types, but most forms may include one or more of the following:        -  Directions regarding life-prolonging treatments        -  Directions regarding artificially provided nutrition/hydration        -  Choosing a healthcare decision maker        -  Direction regarding organ/tissue donation    Durable Power of  for Healthcare - this document names an -in-fact to make medical decisions for you, but it may also allow this person to make personal and financial decisions for you. Please seek the advice of an  if you need this type of document.    **Advance Directives are not required and no one may discriminate against you if you do not sign one.    Medical Orders  Many states allow specific forms/orders signed by your physician to record your wishes for medical treatment in your current state of health. This form, signed in personal communication with your physician, addresses resuscitation and other medical interventions that you may or may not want.      For more information or to schedule a time with a [facility] Advance Care Planning Facilitator contact: [facility].M-DAQ/ACP or call [phone] and someone will contact you directly.

## 2024-05-20 NOTE — PROGRESS NOTES
Chief Complaint   Patient presents with    Medicare Wellness-subsequent       History of Present Illness      The patient presents for an established patient physical examination and health maintenance visit.    The patient presents for a follow-up related to hypertension. The patient reports that she has had no headaches, chest pain, dyspnea, edema, syncope, blurred vision or palpitations. She states that she is taking her medication as prescribed. She is not having medication side effects.    The patient presents for a follow-up related to hyperlipidemia. She is following a low fat diet. She reports that she is exercising. She is taking pravastatin. The patient is taking her medication as prescribed. She reports no medication side effects, including muscle cramps, abdominal pain, headaches or weakness. She denies orthopnea, paroxysmal nocturnal dyspnea or dyspnea on exertion.    The patient presents for a follow-up related to iron deficiency anemia. There are no reports of blood loss. The patient has no symptoms of a dry cough, a wet cough, wheezing, fever, nausea, vomiting, diarrhea, myalgias, abdominal pain, sweats, weight loss, decreased appetite, chills, fatigue or paresthesias. The patient's energy level is normal.    The patient presents for a follow-up related to GERD. The patient is on omeprazole for her gastroesophageal reflux. The medication is taken on a regular basis and gives complete relief of the symptoms. She reports no belching, dysphagia, early satiety, heartburn, hoarseness, odynophagia or rectal bleeding. The GERD has no known aggravating factors.    The patient presents for a follow-up related to hypothyroidism. She reports a good energy level. She reports no hair loss, heat intolerance, cold intolerance, constipation or sweats. She is taking her medication as prescribed.    Medications      Current Outpatient Medications:     amLODIPine (NORVASC) 2.5 MG tablet, Take 1 tablet by mouth Daily.,  "Disp: 90 tablet, Rfl: 3    Cholecalciferol (VITAMIN D3 PO), Take 2,000 Units by mouth Daily., Disp: , Rfl:     Cyanocobalamin (VITAMIN B 12 PO), Take 1 tablet by mouth Daily., Disp: , Rfl:     Ferrous Sulfate ER 50 MG tablet controlled-release, Take 1 tablet by mouth Daily., Disp: , Rfl:     gabapentin (NEURONTIN) 300 MG capsule, Take 1 capsule by mouth 3 (Three) Times a Day., Disp: 90 capsule, Rfl: 2    ibuprofen (ADVIL,MOTRIN) 200 MG tablet, Take 3 tablets by mouth Every 8 (Eight) Hours As Needed., Disp: , Rfl:     levothyroxine (SYNTHROID, LEVOTHROID) 75 MCG tablet, TAKE 1 TABLET EVERY DAY (DOSE CHANGE), Disp: 90 tablet, Rfl: 10    Melatonin 10 MG capsule, Take  by mouth Every Night., Disp: , Rfl:     omeprazole (priLOSEC) 20 MG capsule, Take 1 capsule by mouth Daily., Disp: 90 capsule, Rfl: 3    pravastatin (PRAVACHOL) 20 MG tablet, Take 1 tablet by mouth Every Night., Disp: 90 tablet, Rfl: 3    Probiotic Product (PROBIOTIC ADVANCED PO), Take  by mouth Daily., Disp: , Rfl:     ursodiol (ACTIGALL) 500 MG tablet, Take 1 tablet by mouth 2 (Two) Times a Day With Meals., Disp: 180 tablet, Rfl: 3     Allergies    Allergies   Allergen Reactions    Cefadroxil Other (See Comments)     C-DIFF    Clindamycin/Lincomycin Other (See Comments)     C-DIFF    Oxycodone GI Intolerance     Nausea and vomiting       Problem List    Patient Active Problem List   Diagnosis    Acquired hypothyroidism    Primary hypertension    Mixed hyperlipidemia    Varicose veins of lower extremity    Gastroesophageal reflux disease with esophagitis without hemorrhage    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    Physical Examination    /70   Pulse 76   Temp 98.6 °F (37 °C) (Infrared)   Resp 18   Ht 149.2 cm (58.75\")   Wt 63.5 kg (140 lb)   LMP  (LMP Unknown)   BMI 28.52 kg/m²       HEENT: Head- Normocephalic Atraumatic. Facies- Within " normal limits. Pinnas- Normal texture and shape bilaterally. Canals- Normal bilaterally. TMs- Normal bilaterally. Nares- Patent bilaterally. Nasal Septum- is normal. There is no tenderness to palpation over the frontal or maxillary sinuses. Lids- Normal bilaterally. Conjunctiva- Clear bilaterally. Sclera- Anicteric bilaterally. Oropharynx- Moist with no lesions. Tonsils- No enlargement, erythema or exudate.    Neck: Thyroid- non enlarged, symmetric and has no nodules. No bruits are detected. ROM- Normal Range of Motion with no rigidity.    Lungs: Auscultation- Clear to auscultation bilaterally. There are no retractions, clubbing or cyanosis. The Expiratory to Inspiratory ratio is equal.    Lymph Nodes: Cervical- no enlarged lymph nodes noted. Clavicular- Deferred. Axillary- Deferred. Inguinal- Deferred.    Cardiovascular: There are no carotid bruits. Heart- Normal Rate with Regular rhythm and no murmurs. There are no gallops. There are no rubs. In the lower extremities there is no edema. The upper extremities do not have edema.    Abdomen: Soft, benign, non-tender with no masses, hernias, organomegaly or scars.    Breast: Normal contours bilaterally with no masses, discharge, skin changes or lumps. There are no scars noted.    Dermatologic: The patient has no worrisome or suspicious skin lesions noted.    Impression and Assessment    Normal Physical Examination.    Essential Hypertension.    Hyperlipidemia.    Iron Deficiency Anemia.    Gastroesophageal Reflux Disease.    Hypothyroidism.    Plan    Gastroesophageal Reflux Disease Plan: The patient was instructed to continue the current medications.    Essential Hypertension Plan: The patient was instructed to continue the current medications.    Hyperlipidemia Plan: The patient was instructed to exercise daily, eat a low fat diet and continue her medications.    Hypothyroidism Plan: The patient was instructed to continue the current medications.    Iron Deficiency  Anemia Plan: Further plans will be made after results of tests are available.    Counseling was provided regarding: Adequate Aerobic Exercise, Dental Visits, Flossing Teeth, Heart Healthy Diet, Seat Belt Utilization and Weight Lose.    No screening tests are indicated at this time.       Immunizations Ordered and Administered: None.    Diagnoses and all orders for this visit:    1. Medicare annual wellness visit, subsequent (Primary)    2. Physical exam    3. Essential hypertension  -     CBC & Differential; Future  -     Comprehensive Metabolic Panel; Future  -     POC Urinalysis Dipstick, Automated; Future    4. Mixed hyperlipidemia  -     Comprehensive Metabolic Panel; Future  -     Lipid Panel; Future    5. Gastroesophageal reflux disease with esophagitis without hemorrhage    6. Iron deficiency anemia, unspecified iron deficiency anemia type  -     CBC & Differential; Future  -     Ferritin; Future  -     Iron Profile; Future    7. Acquired hypothyroidism  -     TSH; Future  -     T4, Free; Future        Return to Office    The patient was instructed to return for follow-up in 6 months. The patient was instructed to return sooner if the condition changes, worsens, or does not resolve.

## 2024-05-20 NOTE — PROGRESS NOTES
The ABCs of the Annual Wellness Visit  Subsequent Medicare Wellness Visit    Subjective      Petty Artis is a 83 y.o. female who presents for a Subsequent Medicare Wellness Visit.    The following portions of the patient's history were reviewed and   updated as appropriate: allergies, current medications, past family history, past medical history, past social history, past surgical history, and problem list.    Compared to one year ago, the patient feels her physical   health is the same.    Compared to one year ago, the patient feels her mental   health is the same.    Recent Hospitalizations:  She was not admitted to the hospital during the last year.       Current Medical Providers:  Patient Care Team:  Lopez Dumont MD as PCP - General (Internal Medicine)  Gerry Nassar PA (Physician Assistant)    Outpatient Medications Prior to Visit   Medication Sig Dispense Refill    amLODIPine (NORVASC) 2.5 MG tablet Take 1 tablet by mouth Daily. 90 tablet 3    Cholecalciferol (VITAMIN D3 PO) Take 2,000 Units by mouth Daily.      Cyanocobalamin (VITAMIN B 12 PO) Take 1 tablet by mouth Daily.      Ferrous Sulfate ER 50 MG tablet controlled-release Take 1 tablet by mouth Daily.      gabapentin (NEURONTIN) 300 MG capsule Take 1 capsule by mouth 3 (Three) Times a Day. 90 capsule 2    ibuprofen (ADVIL,MOTRIN) 200 MG tablet Take 3 tablets by mouth Every 8 (Eight) Hours As Needed.      levothyroxine (SYNTHROID, LEVOTHROID) 75 MCG tablet TAKE 1 TABLET EVERY DAY (DOSE CHANGE) 90 tablet 10    Melatonin 10 MG capsule Take  by mouth Every Night.      omeprazole (priLOSEC) 20 MG capsule Take 1 capsule by mouth Daily. 90 capsule 3    pravastatin (PRAVACHOL) 20 MG tablet Take 1 tablet by mouth Every Night. 90 tablet 3    Probiotic Product (PROBIOTIC ADVANCED PO) Take  by mouth Daily.      ursodiol (ACTIGALL) 500 MG tablet Take 1 tablet by mouth 2 (Two) Times a Day With Meals. 180 tablet 3     No facility-administered  "medications prior to visit.       No opioid medication identified on active medication list. I have reviewed chart for other potential  high risk medication/s and harmful drug interactions in the elderly.        Aspirin is not on active medication list.  Aspirin use is not indicated based on review of current medical condition/s. Risk of harm outweighs potential benefits.  .    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  ACP discussion was held with the patient during this visit. Patient does not have an advance directive, information provided.     Objective    Vitals:    24 1054   BP: 144/82   BP Location: Left arm   Patient Position: Sitting   Cuff Size: Adult   Pulse: 76   Resp: 18   Temp: 98.6 °F (37 °C)   TempSrc: Infrared   Weight: 63.5 kg (140 lb)   Height: 149.2 cm (58.75\")   PainSc: 0-No pain     Estimated body mass index is 28.52 kg/m² as calculated from the following:    Height as of this encounter: 149.2 cm (58.75\").    Weight as of this encounter: 63.5 kg (140 lb).       Finger Rub Hearing{Test (right ear):passed  Finger Rub Hearing{Test (left ear):failed      Does the patient have evidence of cognitive impairment?   No            HEALTH RISK ASSESSMENT    Smoking Status:  Social History     Tobacco Use   Smoking Status Never    Passive exposure: Past   Smokeless Tobacco Never     Alcohol Consumption:  Social History     Substance and Sexual Activity   Alcohol Use Not Currently    Comment: rarely     Fall Risk Screen:    CADEN Fall Risk Assessment was completed, and patient is at LOW risk for falls.Assessment completed on:2024    Depression Screenin/20/2024    " 11:01 AM   PHQ-2/PHQ-9 Depression Screening   Little Interest or Pleasure in Doing Things 0-->not at all   Feeling Down, Depressed or Hopeless 0-->not at all   PHQ-9: Brief Depression Severity Measure Score 0       Health Habits and Functional and Cognitive Screenin/20/2024    11:00 AM   Functional & Cognitive Status   Do you have difficulty preparing food and eating? No   Do you have difficulty bathing yourself, getting dressed or grooming yourself? No   Do you have difficulty using the toilet? No   Do you have difficulty moving around from place to place? Yes   Do you have trouble with steps or getting out of a bed or a chair? Yes   Current Diet Limited Junk Food   Dental Exam Up to date   Eye Exam Not up to date   Exercise (times per week) 0 times per week   Current Exercises Include No Regular Exercise   Do you need help using the phone?  No   Are you deaf or do you have serious difficulty hearing?  No   Do you need help to go to places out of walking distance? Yes   Do you need help shopping? No   Do you need help preparing meals?  No   Do you need help with housework?  No   Do you need help with laundry? No   Do you need help taking your medications? No   Do you need help managing money? No   Do you ever drive or ride in a car without wearing a seat belt? No   Have you felt unusual stress, anger or loneliness in the last month? No   Who do you live with? Alone   If you need help, do you have trouble finding someone available to you? No   Have you been bothered in the last four weeks by sexual problems? No   Do you have difficulty concentrating, remembering or making decisions? No       Age-appropriate Screening Schedule:  Refer to the list below for future screening recommendations based on patient's age, sex and/or medical conditions. Orders for these recommended tests are listed in the plan section. The patient has been provided with a written plan.    Health Maintenance   Topic Date Due     Hepatitis B (1 of 3 - Risk 3-dose series) Never done    RSV Vaccine - Adults (1 - 1-dose 60+ series) Never done    ZOSTER VACCINE (2 of 3) 09/09/2017    COVID-19 Vaccine (6 - 2023-24 season) 09/01/2023    DXA SCAN  12/03/2023    ANNUAL WELLNESS VISIT  05/23/2024    BMI FOLLOWUP  05/23/2024    INFLUENZA VACCINE  08/01/2024    LIPID PANEL  09/21/2024    TDAP/TD VACCINES (2 - Td or Tdap) 04/24/2025    Pneumococcal Vaccine 65+  Completed                  CMS Preventative Services Quick Reference  Risk Factors Identified During Encounter:    Immunizations Discussed/Encouraged: Hepatitis B Vaccine/Series, COVID19, and RSV (Respiratory Syncytial Virus)    The above risks/problems have been discussed with the patient.  Pertinent information has been shared with the patient in the After Visit Summary.    Diagnoses and all orders for this visit:    1. Medicare annual wellness visit, subsequent (Primary)    2. Physical exam    3. Essential hypertension  -     CBC & Differential; Future  -     Comprehensive Metabolic Panel; Future  -     POC Urinalysis Dipstick, Automated; Future    4. Mixed hyperlipidemia  -     Comprehensive Metabolic Panel; Future  -     Lipid Panel; Future    5. Gastroesophageal reflux disease with esophagitis without hemorrhage    6. Iron deficiency anemia, unspecified iron deficiency anemia type  -     CBC & Differential; Future  -     Ferritin; Future  -     Iron Profile; Future    7. Acquired hypothyroidism  -     TSH; Future  -     T4, Free; Future          Follow Up:   Next Medicare Wellness visit to be scheduled in 1 year.      An After Visit Summary and PPPS were made available to the patient.

## 2024-05-21 LAB
ALBUMIN SERPL-MCNC: 3.7 G/DL (ref 3.5–5.2)
ALBUMIN/GLOB SERPL: 1.3 G/DL
ALP SERPL-CCNC: 83 U/L (ref 39–117)
ALT SERPL-CCNC: 16 U/L (ref 1–33)
AST SERPL-CCNC: 22 U/L (ref 1–32)
BASOPHILS # BLD AUTO: 0.03 10*3/MM3 (ref 0–0.2)
BASOPHILS NFR BLD AUTO: 1 % (ref 0–1.5)
BILIRUB SERPL-MCNC: 0.6 MG/DL (ref 0–1.2)
BUN SERPL-MCNC: 6 MG/DL (ref 8–23)
BUN/CREAT SERPL: 11.1 (ref 7–25)
CALCIUM SERPL-MCNC: 7.5 MG/DL (ref 8.6–10.5)
CHLORIDE SERPL-SCNC: 101 MMOL/L (ref 98–107)
CHOLEST SERPL-MCNC: 150 MG/DL (ref 0–200)
CO2 SERPL-SCNC: 26.9 MMOL/L (ref 22–29)
CREAT SERPL-MCNC: 0.54 MG/DL (ref 0.57–1)
EGFRCR SERPLBLD CKD-EPI 2021: 91.5 ML/MIN/1.73
EOSINOPHIL # BLD AUTO: 0.1 10*3/MM3 (ref 0–0.4)
EOSINOPHIL NFR BLD AUTO: 3.2 % (ref 0.3–6.2)
ERYTHROCYTE [DISTWIDTH] IN BLOOD BY AUTOMATED COUNT: 11.7 % (ref 12.3–15.4)
FERRITIN SERPL-MCNC: 99.1 NG/ML (ref 13–150)
GLOBULIN SER CALC-MCNC: 2.9 GM/DL
GLUCOSE SERPL-MCNC: 109 MG/DL (ref 65–99)
HCT VFR BLD AUTO: 40.9 % (ref 34–46.6)
HDLC SERPL-MCNC: 70 MG/DL (ref 40–60)
HGB BLD-MCNC: 13.7 G/DL (ref 12–15.9)
IMM GRANULOCYTES # BLD AUTO: 0.01 10*3/MM3 (ref 0–0.05)
IMM GRANULOCYTES NFR BLD AUTO: 0.3 % (ref 0–0.5)
IRON SATN MFR SERPL: 49 % (ref 20–50)
IRON SERPL-MCNC: 178 MCG/DL (ref 37–145)
LDLC SERPL CALC-MCNC: 68 MG/DL (ref 0–100)
LYMPHOCYTES # BLD AUTO: 1.16 10*3/MM3 (ref 0.7–3.1)
LYMPHOCYTES NFR BLD AUTO: 37.2 % (ref 19.6–45.3)
MCH RBC QN AUTO: 33.6 PG (ref 26.6–33)
MCHC RBC AUTO-ENTMCNC: 33.5 G/DL (ref 31.5–35.7)
MCV RBC AUTO: 100.2 FL (ref 79–97)
MONOCYTES # BLD AUTO: 0.2 10*3/MM3 (ref 0.1–0.9)
MONOCYTES NFR BLD AUTO: 6.4 % (ref 5–12)
NEUTROPHILS # BLD AUTO: 1.62 10*3/MM3 (ref 1.7–7)
NEUTROPHILS NFR BLD AUTO: 51.9 % (ref 42.7–76)
NRBC BLD AUTO-RTO: 0 /100 WBC (ref 0–0.2)
PLATELET # BLD AUTO: 168 10*3/MM3 (ref 140–450)
POTASSIUM SERPL-SCNC: 4 MMOL/L (ref 3.5–5.2)
PROT SERPL-MCNC: 6.6 G/DL (ref 6–8.5)
RBC # BLD AUTO: 4.08 10*6/MM3 (ref 3.77–5.28)
SODIUM SERPL-SCNC: 136 MMOL/L (ref 136–145)
T4 FREE SERPL-MCNC: 1.29 NG/DL (ref 0.93–1.7)
TIBC SERPL-MCNC: 365 MCG/DL
TRIGL SERPL-MCNC: 60 MG/DL (ref 0–150)
TSH SERPL DL<=0.005 MIU/L-ACNC: 4.01 UIU/ML (ref 0.27–4.2)
UIBC SERPL-MCNC: 187 MCG/DL (ref 112–346)
VLDLC SERPL CALC-MCNC: 12 MG/DL (ref 5–40)
WBC # BLD AUTO: 3.12 10*3/MM3 (ref 3.4–10.8)

## 2024-05-23 LAB
BACTERIA #/AREA URNS HPF: ABNORMAL /HPF
BACTERIA UR CULT: ABNORMAL
CASTS URNS MICRO: ABNORMAL
EPI CELLS #/AREA URNS HPF: ABNORMAL /HPF
OTHER ANTIBIOTIC SUSC ISLT: ABNORMAL
RBC #/AREA URNS HPF: ABNORMAL /HPF
WBC #/AREA URNS HPF: ABNORMAL /HPF

## 2024-06-14 DIAGNOSIS — K21.00 GASTROESOPHAGEAL REFLUX DISEASE WITH ESOPHAGITIS WITHOUT HEMORRHAGE: ICD-10-CM

## 2024-06-14 DIAGNOSIS — E78.2 MIXED HYPERLIPIDEMIA: ICD-10-CM

## 2024-06-14 DIAGNOSIS — E03.9 ACQUIRED HYPOTHYROIDISM: ICD-10-CM

## 2024-06-14 DIAGNOSIS — I10 PRIMARY HYPERTENSION: ICD-10-CM

## 2024-06-17 RX ORDER — LEVOTHYROXINE SODIUM 0.07 MG/1
75 TABLET ORAL DAILY
Qty: 90 TABLET | Refills: 3 | Status: SHIPPED | OUTPATIENT
Start: 2024-06-17

## 2024-06-17 RX ORDER — AMLODIPINE BESYLATE 2.5 MG/1
2.5 TABLET ORAL DAILY
Qty: 90 TABLET | Refills: 3 | Status: SHIPPED | OUTPATIENT
Start: 2024-06-17

## 2024-06-17 RX ORDER — OMEPRAZOLE 20 MG/1
20 CAPSULE, DELAYED RELEASE ORAL DAILY
Qty: 90 CAPSULE | Refills: 3 | Status: SHIPPED | OUTPATIENT
Start: 2024-06-17

## 2024-06-17 RX ORDER — PRAVASTATIN SODIUM 20 MG
20 TABLET ORAL NIGHTLY
Qty: 90 TABLET | Refills: 3 | Status: SHIPPED | OUTPATIENT
Start: 2024-06-17

## 2024-08-08 DIAGNOSIS — M54.50 CHRONIC MIDLINE LOW BACK PAIN WITHOUT SCIATICA: ICD-10-CM

## 2024-08-08 DIAGNOSIS — G89.29 CHRONIC MIDLINE LOW BACK PAIN WITHOUT SCIATICA: ICD-10-CM

## 2024-08-08 RX ORDER — GABAPENTIN 300 MG/1
300 CAPSULE ORAL 3 TIMES DAILY
Qty: 270 CAPSULE | Refills: 1 | Status: SHIPPED | OUTPATIENT
Start: 2024-08-08

## 2024-11-20 ENCOUNTER — OFFICE VISIT (OUTPATIENT)
Dept: INTERNAL MEDICINE | Facility: CLINIC | Age: 84
End: 2024-11-20
Payer: MEDICARE

## 2024-11-20 VITALS
TEMPERATURE: 98.7 F | BODY MASS INDEX: 27.09 KG/M2 | SYSTOLIC BLOOD PRESSURE: 138 MMHG | DIASTOLIC BLOOD PRESSURE: 76 MMHG | WEIGHT: 133 LBS | HEART RATE: 76 BPM | RESPIRATION RATE: 18 BRPM

## 2024-11-20 DIAGNOSIS — E03.9 ACQUIRED HYPOTHYROIDISM: ICD-10-CM

## 2024-11-20 DIAGNOSIS — I10 ESSENTIAL HYPERTENSION: Primary | ICD-10-CM

## 2024-11-20 DIAGNOSIS — E78.2 MIXED HYPERLIPIDEMIA: ICD-10-CM

## 2024-11-20 DIAGNOSIS — R82.90 ABNORMAL URINE: ICD-10-CM

## 2024-11-20 DIAGNOSIS — R10.84 GENERALIZED ABDOMINAL PAIN: ICD-10-CM

## 2024-11-20 DIAGNOSIS — D50.9 IRON DEFICIENCY ANEMIA, UNSPECIFIED IRON DEFICIENCY ANEMIA TYPE: ICD-10-CM

## 2024-11-20 DIAGNOSIS — K21.00 GASTROESOPHAGEAL REFLUX DISEASE WITH ESOPHAGITIS WITHOUT HEMORRHAGE: ICD-10-CM

## 2024-11-20 LAB
ALBUMIN SERPL-MCNC: 3.5 G/DL (ref 3.5–5.2)
ALBUMIN/GLOB SERPL: 1 G/DL
ALP SERPL-CCNC: 84 U/L (ref 39–117)
ALT SERPL W P-5'-P-CCNC: 10 U/L (ref 1–33)
ANION GAP SERPL CALCULATED.3IONS-SCNC: 10.4 MMOL/L (ref 5–15)
AST SERPL-CCNC: 26 U/L (ref 1–32)
BASOPHILS # BLD AUTO: 0.02 10*3/MM3 (ref 0–0.2)
BASOPHILS NFR BLD AUTO: 0.5 % (ref 0–1.5)
BILIRUB BLD-MCNC: NEGATIVE MG/DL
BILIRUB SERPL-MCNC: 0.4 MG/DL (ref 0–1.2)
BUN SERPL-MCNC: 5 MG/DL (ref 8–23)
BUN/CREAT SERPL: 10.6 (ref 7–25)
CALCIUM SPEC-SCNC: 8.6 MG/DL (ref 8.6–10.5)
CHLORIDE SERPL-SCNC: 97 MMOL/L (ref 98–107)
CHOLEST SERPL-MCNC: 138 MG/DL (ref 0–200)
CLARITY, POC: CLEAR
CO2 SERPL-SCNC: 26.6 MMOL/L (ref 22–29)
COLOR UR: YELLOW
CREAT SERPL-MCNC: 0.47 MG/DL (ref 0.57–1)
DEPRECATED RDW RBC AUTO: 38.9 FL (ref 37–54)
EGFRCR SERPLBLD CKD-EPI 2021: 94 ML/MIN/1.73
EOSINOPHIL # BLD AUTO: 0.14 10*3/MM3 (ref 0–0.4)
EOSINOPHIL NFR BLD AUTO: 3.8 % (ref 0.3–6.2)
ERYTHROCYTE [DISTWIDTH] IN BLOOD BY AUTOMATED COUNT: 11.5 % (ref 12.3–15.4)
EXPIRATION DATE: ABNORMAL
FERRITIN SERPL-MCNC: 47.3 NG/ML (ref 13–150)
GLOBULIN UR ELPH-MCNC: 3.4 GM/DL
GLUCOSE SERPL-MCNC: 125 MG/DL (ref 65–99)
GLUCOSE UR STRIP-MCNC: NEGATIVE MG/DL
HCT VFR BLD AUTO: 40.3 % (ref 34–46.6)
HDLC SERPL-MCNC: 52 MG/DL (ref 40–60)
HGB BLD-MCNC: 13.6 G/DL (ref 12–15.9)
IMM GRANULOCYTES # BLD AUTO: 0.03 10*3/MM3 (ref 0–0.05)
IMM GRANULOCYTES NFR BLD AUTO: 0.8 % (ref 0–0.5)
IRON 24H UR-MRATE: 31 MCG/DL (ref 37–145)
IRON SATN MFR SERPL: 9 % (ref 20–50)
KETONES UR QL: NEGATIVE
LDLC SERPL CALC-MCNC: 73 MG/DL (ref 0–100)
LDLC/HDLC SERPL: 1.4 {RATIO}
LEUKOCYTE EST, POC: ABNORMAL
LYMPHOCYTES # BLD AUTO: 0.81 10*3/MM3 (ref 0.7–3.1)
LYMPHOCYTES NFR BLD AUTO: 22.2 % (ref 19.6–45.3)
Lab: ABNORMAL
MCH RBC QN AUTO: 31.8 PG (ref 26.6–33)
MCHC RBC AUTO-ENTMCNC: 33.7 G/DL (ref 31.5–35.7)
MCV RBC AUTO: 94.2 FL (ref 79–97)
MONOCYTES # BLD AUTO: 0.37 10*3/MM3 (ref 0.1–0.9)
MONOCYTES NFR BLD AUTO: 10.1 % (ref 5–12)
NEUTROPHILS NFR BLD AUTO: 2.28 10*3/MM3 (ref 1.7–7)
NEUTROPHILS NFR BLD AUTO: 62.6 % (ref 42.7–76)
NITRITE UR-MCNC: NEGATIVE MG/ML
NRBC BLD AUTO-RTO: 0 /100 WBC (ref 0–0.2)
PH UR: 6 [PH] (ref 5–8)
PLATELET # BLD AUTO: 206 10*3/MM3 (ref 140–450)
PMV BLD AUTO: 10.3 FL (ref 6–12)
POTASSIUM SERPL-SCNC: 4 MMOL/L (ref 3.5–5.2)
PROT SERPL-MCNC: 6.9 G/DL (ref 6–8.5)
PROT UR STRIP-MCNC: NEGATIVE MG/DL
RBC # BLD AUTO: 4.28 10*6/MM3 (ref 3.77–5.28)
RBC # UR STRIP: ABNORMAL /UL
SODIUM SERPL-SCNC: 134 MMOL/L (ref 136–145)
SP GR UR: 1.02 (ref 1–1.03)
T4 FREE SERPL-MCNC: 1.56 NG/DL (ref 0.92–1.68)
TIBC SERPL-MCNC: 347 MCG/DL (ref 298–536)
TRANSFERRIN SERPL-MCNC: 233 MG/DL (ref 200–360)
TRIGL SERPL-MCNC: 65 MG/DL (ref 0–150)
TSH SERPL DL<=0.05 MIU/L-ACNC: 3.18 UIU/ML (ref 0.27–4.2)
UROBILINOGEN UR QL: ABNORMAL
VLDLC SERPL-MCNC: 13 MG/DL (ref 5–40)
WBC NRBC COR # BLD AUTO: 3.65 10*3/MM3 (ref 3.4–10.8)

## 2024-11-20 PROCEDURE — 80053 COMPREHEN METABOLIC PANEL: CPT | Performed by: INTERNAL MEDICINE

## 2024-11-20 PROCEDURE — 87086 URINE CULTURE/COLONY COUNT: CPT | Performed by: INTERNAL MEDICINE

## 2024-11-20 PROCEDURE — 99214 OFFICE O/P EST MOD 30 MIN: CPT | Performed by: INTERNAL MEDICINE

## 2024-11-20 PROCEDURE — 87186 SC STD MICRODIL/AGAR DIL: CPT | Performed by: INTERNAL MEDICINE

## 2024-11-20 PROCEDURE — 1126F AMNT PAIN NOTED NONE PRSNT: CPT | Performed by: INTERNAL MEDICINE

## 2024-11-20 PROCEDURE — 3075F SYST BP GE 130 - 139MM HG: CPT | Performed by: INTERNAL MEDICINE

## 2024-11-20 PROCEDURE — 81003 URINALYSIS AUTO W/O SCOPE: CPT | Performed by: INTERNAL MEDICINE

## 2024-11-20 PROCEDURE — 84439 ASSAY OF FREE THYROXINE: CPT | Performed by: INTERNAL MEDICINE

## 2024-11-20 PROCEDURE — 82728 ASSAY OF FERRITIN: CPT | Performed by: INTERNAL MEDICINE

## 2024-11-20 PROCEDURE — 36415 COLL VENOUS BLD VENIPUNCTURE: CPT | Performed by: INTERNAL MEDICINE

## 2024-11-20 PROCEDURE — 80061 LIPID PANEL: CPT | Performed by: INTERNAL MEDICINE

## 2024-11-20 PROCEDURE — 87088 URINE BACTERIA CULTURE: CPT | Performed by: INTERNAL MEDICINE

## 2024-11-20 PROCEDURE — G2211 COMPLEX E/M VISIT ADD ON: HCPCS | Performed by: INTERNAL MEDICINE

## 2024-11-20 PROCEDURE — 85025 COMPLETE CBC W/AUTO DIFF WBC: CPT | Performed by: INTERNAL MEDICINE

## 2024-11-20 PROCEDURE — 84466 ASSAY OF TRANSFERRIN: CPT | Performed by: INTERNAL MEDICINE

## 2024-11-20 PROCEDURE — 83540 ASSAY OF IRON: CPT | Performed by: INTERNAL MEDICINE

## 2024-11-20 PROCEDURE — 3078F DIAST BP <80 MM HG: CPT | Performed by: INTERNAL MEDICINE

## 2024-11-20 PROCEDURE — 84443 ASSAY THYROID STIM HORMONE: CPT | Performed by: INTERNAL MEDICINE

## 2024-11-20 NOTE — PROGRESS NOTES
Chief Complaint   Patient presents with    Follow-up     6 month follow up chronic medical problems    Abdominal distention and fullness       History of Present Illness    The patient presents for a follow-up related to abdominal bloating. She denies additional symptoms. The symptoms are worsened. The symptoms have been present for several months. The symptoms are constant. The patient has not had a fever. The patient denies hematuria, dysuria, constipation, a rash, rectal bleeding, urinary hesitancy or urinary frequency.    The patient presents for a follow-up related to hypertension. The patient reports that she has had no headaches, chest pain, dyspnea, edema, syncope, blurred vision or palpitations. She states that she is taking her medication as prescribed. She is not having medication side effects.    The patient presents for a follow-up related to iron deficiency anemia. There are no reports of blood loss. The patient has no symptoms of a dry cough, a wet cough, wheezing, fever, nausea, vomiting, diarrhea, myalgias, abdominal pain, sweats, weight loss, decreased appetite, chills, fatigue or paresthesias. The patient's energy level is normal.    The patient presents for a follow-up related to hypothyroidism. She reports a good energy level. She reports no hair loss, heat intolerance, cold intolerance or sweats. She is taking her medication as prescribed.    The patient presents for a follow-up related to GERD. The patient is on omeprazole for her gastroesophageal reflux. The medication is taken on a regular basis and gives complete relief of the symptoms. She reports no belching, dysphagia, early satiety, heartburn, hoarseness or odynophagia. The GERD has no known aggravating factors.    The patient presents for a follow-up related to hyperlipidemia. She is following a low fat diet. She reports that she is exercising. She is taking pravastatin. The patient is taking her medication as prescribed. She reports  that she has experienced abdominal pain but denies muscle cramps, headaches or weakness. She denies orthopnea, paroxysmal nocturnal dyspnea or dyspnea on exertion.    Medications      Current Outpatient Medications:     amLODIPine (NORVASC) 2.5 MG tablet, TAKE 1 TABLET EVERY DAY, Disp: 90 tablet, Rfl: 3    Cholecalciferol (VITAMIN D3 PO), Take 2,000 Units by mouth Daily., Disp: , Rfl:     Cyanocobalamin (VITAMIN B 12 PO), Take 1 tablet by mouth Daily., Disp: , Rfl:     gabapentin (NEURONTIN) 300 MG capsule, TAKE 1 CAPSULE THREE TIMES DAILY, Disp: 270 capsule, Rfl: 1    ibuprofen (ADVIL,MOTRIN) 200 MG tablet, Take 3 tablets by mouth Every 8 (Eight) Hours As Needed., Disp: , Rfl:     levothyroxine (SYNTHROID, LEVOTHROID) 75 MCG tablet, TAKE 1 TABLET EVERY DAY, Disp: 90 tablet, Rfl: 3    Melatonin 10 MG capsule, Take  by mouth Every Night., Disp: , Rfl:     omeprazole (priLOSEC) 20 MG capsule, TAKE 1 CAPSULE EVERY DAY, Disp: 90 capsule, Rfl: 3    pravastatin (PRAVACHOL) 20 MG tablet, TAKE 1 TABLET EVERY NIGHT, Disp: 90 tablet, Rfl: 3    Probiotic Product (PROBIOTIC ADVANCED PO), Take  by mouth Daily., Disp: , Rfl:     ursodiol (ACTIGALL) 500 MG tablet, Take 1 tablet by mouth 2 (Two) Times a Day With Meals., Disp: 180 tablet, Rfl: 3    Ferrous Sulfate ER 50 MG tablet controlled-release, Take 1 tablet by mouth Daily. 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    Physical Examination    /76 (BP Location: Left arm, Patient Position: Sitting, Cuff Size: Adult)   Pulse 76   Temp 98.7 °F (37.1 °C) (Infrared)   Resp 18   Wt 60.3 kg (133 lb)   LMP  (LMP Unknown)   BMI 27.09 kg/m²       HEENT: Head- Normocephalic Atraumatic. Facies- Within normal limits. Pinnas- Normal texture and shape bilaterally. Canals- Normal bilaterally. TMs- Normal bilaterally. Nares- Patent bilaterally. Nasal Septum- is normal. There is no tenderness to palpation over the frontal or maxillary sinuses. Lids- Normal bilaterally. Conjunctiva- Clear bilaterally. Sclera- Anicteric bilaterally. Oropharynx- Moist with no lesions. Tonsils- No enlargement, erythema or exudate.    Neck: Thyroid- non enlarged, symmetric and has no nodules. No bruits are detected. ROM- Normal Range of Motion with no rigidity.    Lungs: Auscultation- Clear to auscultation bilaterally. There are no retractions, clubbing or cyanosis. The Expiratory to Inspiratory ratio is equal.    Lymph Nodes: Cervical- no enlarged lymph nodes noted. Clavicular- Deferred. Axillary- Deferred. Inguinal- Deferred.    Cardiovascular: There are no carotid bruits. Heart- Normal Rate with Regular rhythm and no murmurs. There are no gallops. There are no rubs. In the lower extremities there is no edema. The upper extremities do not have edema.    Abdomen: Soft, benign, non-tender with no masses, hernias, organomegaly or scars.      Impression and Assessment    Abdominal Bloating.    Essential Hypertension.    Iron Deficiency Anemia.    Hypothyroidism.    Gastroesophageal Reflux Disease.    Hyperlipidemia.    Plan    Abdominal Bloating Plan: Further plans will be made after the tests are reviewed.    Gastroesophageal Reflux Disease Plan: The patient was instructed to continue the current medications.    Essential Hypertension Plan: The patient was instructed to continue the current  medications.    Hyperlipidemia Plan: The patient was instructed to exercise daily, eat a low fat diet and continue her medications.    Hypothyroidism Plan: The patient was instructed to continue the current medications.    Iron Deficiency Anemia Plan: Further plans will be made after results of tests are available.    Diagnoses and all orders for this visit:    1. Essential hypertension (Primary)  -     CBC & Differential; Future  -     Comprehensive Metabolic Panel; Future  -     POC Urinalysis Dipstick, Automated; Future    2. Iron deficiency anemia, unspecified iron deficiency anemia type  -     CBC & Differential; Future  -     Iron Profile; Future  -     Ferritin; Future    3. Acquired hypothyroidism  -     TSH; Future  -     T4, Free; Future    4. Generalized abdominal pain  -     CBC & Differential; Future  -     Comprehensive Metabolic Panel; Future  -     POC Urinalysis Dipstick, Automated; Future  -     CT Abdomen Pelvis With Contrast; Future    5. Mixed hyperlipidemia  -     Comprehensive Metabolic Panel; Future  -     Lipid Panel; Future    6. Gastroesophageal reflux disease with esophagitis without hemorrhage            Return to Office    The patient was instructed to return for follow-up in 6 months. The patient was instructed to return sooner if the condition changes, worsens, or does not resolve.

## 2024-11-21 DIAGNOSIS — G89.29 CHRONIC MIDLINE LOW BACK PAIN WITHOUT SCIATICA: ICD-10-CM

## 2024-11-21 DIAGNOSIS — K21.00 GASTROESOPHAGEAL REFLUX DISEASE WITH ESOPHAGITIS WITHOUT HEMORRHAGE: ICD-10-CM

## 2024-11-21 DIAGNOSIS — I10 PRIMARY HYPERTENSION: ICD-10-CM

## 2024-11-21 DIAGNOSIS — E78.2 MIXED HYPERLIPIDEMIA: ICD-10-CM

## 2024-11-21 DIAGNOSIS — M54.50 CHRONIC MIDLINE LOW BACK PAIN WITHOUT SCIATICA: ICD-10-CM

## 2024-11-21 DIAGNOSIS — E03.9 ACQUIRED HYPOTHYROIDISM: ICD-10-CM

## 2024-11-21 DIAGNOSIS — K74.3 HEPATIC CIRRHOSIS DUE TO PRIMARY BILIARY CHOLANGITIS: ICD-10-CM

## 2024-11-21 RX ORDER — URSODIOL 500 MG/1
500 TABLET, FILM COATED ORAL 2 TIMES DAILY WITH MEALS
Qty: 180 TABLET | Refills: 3 | Status: SHIPPED | OUTPATIENT
Start: 2024-11-21 | End: 2025-11-21

## 2024-11-21 RX ORDER — AMLODIPINE BESYLATE 2.5 MG/1
2.5 TABLET ORAL DAILY
Qty: 90 TABLET | Refills: 3 | Status: SHIPPED | OUTPATIENT
Start: 2024-11-21

## 2024-11-21 RX ORDER — GABAPENTIN 300 MG/1
300 CAPSULE ORAL 3 TIMES DAILY
Qty: 270 CAPSULE | Refills: 1 | Status: SHIPPED | OUTPATIENT
Start: 2024-11-21

## 2024-11-21 RX ORDER — PRAVASTATIN SODIUM 20 MG
20 TABLET ORAL NIGHTLY
Qty: 90 TABLET | Refills: 3 | Status: SHIPPED | OUTPATIENT
Start: 2024-11-21

## 2024-11-21 RX ORDER — LEVOTHYROXINE SODIUM 75 UG/1
75 TABLET ORAL DAILY
Qty: 90 TABLET | Refills: 3 | Status: SHIPPED | OUTPATIENT
Start: 2024-11-21

## 2024-11-22 LAB
BACTERIA SPEC AEROBE CULT: ABNORMAL
BACTERIA SPEC AEROBE CULT: ABNORMAL

## 2024-11-25 RX ORDER — NITROFURANTOIN 25; 75 MG/1; MG/1
100 CAPSULE ORAL 2 TIMES DAILY
Qty: 20 CAPSULE | Refills: 0 | Status: SHIPPED | OUTPATIENT
Start: 2024-11-25 | End: 2024-12-05

## 2024-12-12 ENCOUNTER — HOSPITAL ENCOUNTER (OUTPATIENT)
Dept: CT IMAGING | Facility: HOSPITAL | Age: 84
Discharge: HOME | End: 2024-12-12
Admitting: INTERNAL MEDICINE
Payer: MEDICARE

## 2024-12-12 ENCOUNTER — TELEPHONE (OUTPATIENT)
Dept: INTERNAL MEDICINE | Facility: CLINIC | Age: 84
End: 2024-12-12
Payer: MEDICARE

## 2024-12-12 DIAGNOSIS — R10.84 GENERALIZED ABDOMINAL PAIN: ICD-10-CM

## 2024-12-12 PROCEDURE — 25510000001 IOPAMIDOL 61 % SOLUTION: Performed by: INTERNAL MEDICINE

## 2024-12-12 PROCEDURE — 74177 CT ABD & PELVIS W/CONTRAST: CPT

## 2024-12-12 PROCEDURE — 25510000002 DIATRIZOATE MEGLUMINE & SODIUM PER 1 ML: Performed by: INTERNAL MEDICINE

## 2024-12-12 RX ORDER — IOPAMIDOL 612 MG/ML
85 INJECTION, SOLUTION INTRAVASCULAR
Status: COMPLETED | OUTPATIENT
Start: 2024-12-12 | End: 2024-12-12

## 2024-12-12 RX ORDER — DIATRIZOATE MEGLUMINE AND DIATRIZOATE SODIUM 660; 100 MG/ML; MG/ML
15 SOLUTION ORAL; RECTAL ONCE
Status: COMPLETED | OUTPATIENT
Start: 2024-12-12 | End: 2024-12-12

## 2024-12-12 RX ADMIN — DIATRIZOATE MEGLUMINE AND DIATRIZOATE SODIUM 15 ML: 660; 100 LIQUID ORAL; RECTAL at 11:16

## 2024-12-12 RX ADMIN — IOPAMIDOL 85 ML: 612 INJECTION, SOLUTION INTRAVENOUS at 11:16

## 2024-12-12 NOTE — TELEPHONE ENCOUNTER
Caller: Petty Artis    Relationship: Self    Best call back number: [phone]     What was the call regarding: PATIENT IS PLANNING ON LEAVING TOWN AND NEEDS HER CT RESULTS BEFORE SHE DOES. PLEASE ADVISE.     Is it okay if the provider responds through MyChart: NO

## 2024-12-13 ENCOUNTER — OFFICE VISIT (OUTPATIENT)
Dept: GYNECOLOGIC ONCOLOGY | Facility: CLINIC | Age: 84
End: 2024-12-13
Payer: MEDICARE

## 2024-12-13 VITALS
SYSTOLIC BLOOD PRESSURE: 163 MMHG | BODY MASS INDEX: 27.88 KG/M2 | HEART RATE: 81 BPM | DIASTOLIC BLOOD PRESSURE: 81 MMHG | WEIGHT: 138.3 LBS | TEMPERATURE: 97.5 F | RESPIRATION RATE: 17 BRPM | HEIGHT: 59 IN | OXYGEN SATURATION: 98 %

## 2024-12-13 DIAGNOSIS — N94.89 TUBO-OVARIAN MASS: Primary | ICD-10-CM

## 2024-12-13 DIAGNOSIS — N83.8 OVARIAN MASS: Primary | ICD-10-CM

## 2024-12-13 PROCEDURE — 99204 OFFICE O/P NEW MOD 45 MIN: CPT | Performed by: OBSTETRICS & GYNECOLOGY

## 2024-12-13 PROCEDURE — 1126F AMNT PAIN NOTED NONE PRSNT: CPT | Performed by: OBSTETRICS & GYNECOLOGY

## 2024-12-13 PROCEDURE — 3079F DIAST BP 80-89 MM HG: CPT | Performed by: OBSTETRICS & GYNECOLOGY

## 2024-12-13 PROCEDURE — 1160F RVW MEDS BY RX/DR IN RCRD: CPT | Performed by: OBSTETRICS & GYNECOLOGY

## 2024-12-13 PROCEDURE — 1159F MED LIST DOCD IN RCRD: CPT | Performed by: OBSTETRICS & GYNECOLOGY

## 2024-12-13 PROCEDURE — 3077F SYST BP >= 140 MM HG: CPT | Performed by: OBSTETRICS & GYNECOLOGY

## 2024-12-13 NOTE — TELEPHONE ENCOUNTER
Called results to patient. Will arrange oncology evaluation.  Lopez Dumont MD  07:45 EST  12/13/24

## 2024-12-13 NOTE — PROGRESS NOTES
Petty Artis  [id number]  1940      Reason for visit: Ovarian mass, possible cancer    Consultation:  Patient is being seen at the request of Dr. Lopez Dumont    History of present illness:  The patient is a 84 y.o. year old female who presents today for treatment and evaluation of the above issues.    Urinary frequency, extremity edeam that are new.  Appetite good.  BM - constia[tion that is chronic.  Back pain.  Going to [place] for [place].  Wants to delay IR guided drainage until after holidays.  Mother had colon cancer at 44 yo.  Patient has not had genetic testiong.     For new patients, PFSH intake form from today was reviewed and confirmed.    Oncologic History:  Oncology/Hematology History    No history exists.         Past Medical History:   Diagnosis Date    C. difficile colitis     Cataract 2009    Colon polyp 2001    Disease of thyroid gland     GERD (gastroesophageal reflux disease) 2011    Hyperlipidemia     Hypertension     Liver disease 2020    Osteopenia 2021       Past Surgical History:   Procedure Laterality Date    CARDIAC CATHETERIZATION  2011    COLONOSCOPY  2018    KYPHOPLASTY      5/21, 7/21, AND 8/21    THYROIDECTOMY, PARTIAL  07/2010       MEDICATIONS:    Current Outpatient Medications:     amLODIPine (NORVASC) 2.5 MG tablet, Take 1 tablet by mouth Daily., Disp: 90 tablet, Rfl: 3    Cholecalciferol (VITAMIN D3 PO), Take 2,000 Units by mouth Daily., Disp: , Rfl:     Cyanocobalamin (VITAMIN B 12 PO), Take 1 tablet by mouth Daily., Disp: , Rfl:     Ferrous Sulfate ER 50 MG tablet controlled-release, Take 1 tablet by mouth Daily., Disp: , Rfl:     gabapentin (NEURONTIN) 300 MG capsule, Take 1 capsule by mouth 3 (Three) Times a Day., Disp: 270 capsule, Rfl: 1    ibuprofen (ADVIL,MOTRIN) 200 MG tablet, Take 3 tablets by mouth Every 8 (Eight) Hours As Needed., Disp: , Rfl:     levothyroxine (SYNTHROID, LEVOTHROID) 75 MCG tablet, Take 1 tablet by mouth Daily., Disp: 90 tablet, Rfl:  3    Melatonin 10 MG capsule, Take  by mouth Every Night., Disp: , Rfl:     omeprazole (priLOSEC) 20 MG capsule, Take 1 capsule by mouth Daily., Disp: 90 capsule, Rfl: 3    pravastatin (PRAVACHOL) 20 MG tablet, Take 1 tablet by mouth Every Night., Disp: 90 tablet, Rfl: 3    Probiotic Product (PROBIOTIC ADVANCED PO), Take  by mouth Daily., Disp: , Rfl:     ursodiol (ACTIGALL) 500 MG tablet, Take 1 tablet by mouth 2 (Two) Times a Day With Meals., Disp: 180 tablet, Rfl: 3     Allergies:  is allergic to cefadroxil, clindamycin/lincomycin, and oxycodone.    Social History:   Social History     Socioeconomic History    Marital status:    Tobacco Use    Smoking status: Never     Passive exposure: Past    Smokeless tobacco: Never   Vaping Use    Vaping status: Never Used   Substance and Sexual Activity    Alcohol use: Not Currently     Comment: rarely    Drug use: No    Sexual activity: Not Currently     Partners: Male       Family History:    Family History   Problem Relation Age of Onset    Colon cancer Mother     Cancer Mother     Heart disease Father     Hypertension Father     Hyperlipidemia Father     Heart disease Brother     Ovarian cancer Maternal Aunt     Colon cancer Maternal Aunt     Uterine cancer Maternal Aunt     Arthritis Maternal Grandmother     Asthma Maternal Grandmother        Health Maintenance:    Health Maintenance   Topic Date Due    Hepatitis B (1 of 3 - Risk 3-dose series) Never done    RSV Vaccine - Adults (1 - 1-dose 75+ series) Never done    ZOSTER VACCINE (2 of 3) 09/09/2017    DXA SCAN  12/03/2023    COVID-19 Vaccine (6 - 2024-25 season) 09/01/2024    TDAP/TD VACCINES (2 - Td or Tdap) 04/24/2025    ANNUAL WELLNESS VISIT  05/20/2025    LIPID PANEL  11/20/2025    BMI FOLLOWUP  11/20/2025    INFLUENZA VACCINE  Completed    Pneumococcal Vaccine 65+  Completed    HEMOGLOBIN A1C  Discontinued    URINE MICROALBUMIN  Discontinued       Review of Systems:  Please refer to history of present  "illness.  Review of systems otherwise negative.  Physical Exam:  Vitals:    12/13/24 1351   BP: 163/81   Pulse: 81   Resp: 17   Temp: 97.5 °F (36.4 °C)   TempSrc: Temporal   SpO2: 98%   Weight: 62.7 kg (138 lb 4.8 oz)   Height: 149.2 cm (58.74\")   PainSc: 0-No pain     Body mass index is 28.18 kg/m².  Wt Readings from Last 3 Encounters:   12/13/24 62.7 kg (138 lb 4.8 oz)   11/20/24 60.3 kg (133 lb)   05/20/24 63.5 kg (140 lb)     PHQ-9 Depression Screening  Little interest or pleasure in doing things?     Feeling down, depressed, or hopeless?     PHQ-2 Total Score     Trouble falling or staying asleep, or sleeping too much?     Feeling tired or having little energy?     Poor appetite or overeating?     Feeling bad about yourself - or that you are a failure or have let yourself or your family down?     Trouble concentrating on things, such as reading the newspaper or watching television?     Moving or speaking so slowly that other people could have noticed? Or the opposite - being so fidgety or restless that you have been moving around a lot more than usual?     Thoughts that you would be better off dead, or of hurting yourself in some way?     PHQ-9 Total Score     If you checked off any problems, how difficult have these problems made it for you to do your work, take care of things at home, or get along with other people?         GENERAL: Alert, well-appearing female appearing her stated age who is in no apparent distress.   HEENT: Sclera anicteric. Head normocephalic, atraumatic. Mucus membranes moist.   NECK: Trachea midline, supple, without masses.  No thyromegaly.   BREASTS: Deferred  CARDIOVASCULAR: Normal rate, regular rhythm, no murmurs, rubs, or gallops.  Trace bilateral lower extremity peripheral edema.  RESPIRATORY: Clear to auscultation bilaterally, normal respiratory effort  BACK:  No CVA tenderness, no vertebral tenderness on palpation  GASTROINTESTINAL:  Abdomen is soft, non-tender, non-distended, no " "rebound or guarding. large protuberant mass filling most of abdomen.  SKIN:  Warm, dry, well-perfused.  All visible areas intact.  No rashes, lesions, ulcers.  PSYCHIATRIC: AO x3, with appropriate affect, normal thought processes.  NEUROLOGIC: No focal deficits.  Moves extremities well.  MUSCULOSKELETAL: Normal gait and station.   EXTREMITIES:   No cyanosis, clubbing, symmetric.  LYMPHATICS:  No cervical or inguinal adenopathy noted.     PELVIC exam:  DEFERRED    ECOG PS 1    PROCEDURES:  None    Diagnostic Data:    No Images in the past 120 days found..    Lab Results   Component Value Date    WBC 3.65 11/20/2024    HGB 13.6 11/20/2024    HCT 40.3 11/20/2024    MCV 94.2 11/20/2024     11/20/2024    NEUTROABS 2.28 11/20/2024    GLUCOSE 125 (H) 11/20/2024    BUN 5 (L) 11/20/2024    CREATININE 0.47 (L) 11/20/2024    EGFRIFNONA >60 07/01/2022    EGFRIFAFRI >60 07/01/2022     (L) 11/20/2024    K 4.0 11/20/2024    CL 97 (L) 11/20/2024    CO2 26.6 11/20/2024    CALCIUM 8.6 11/20/2024    ALBUMIN 3.5 11/20/2024    AST 26 11/20/2024    ALT 10 11/20/2024    BILITOT 0.4 11/20/2024     Lab Results   Component Value Date    TSH 3.180 11/20/2024    TSH 4.010 05/20/2024    TSH 3.210 01/22/2024    TSH 2.580 01/19/2023    TSH 3.110 08/16/2022     No results found for: \"FT4\"  No results found for: \"\"    Assessment & Plan   This is a 84 y.o. woman with large complex adnexal mass concerning for cancer.  Encounter Diagnosis   Name Primary?    Tubo-ovarian mass Yes     Going to [place] for the holidays over the next couple of weeks and does not want to make any decisions about next steps.  We did talk about the possibility of IR biopsy, and that it is generally frowned upon due to the possibility of seeding tumor.  She has questions regarding the stage and clinically this would be a stage I-2 if this was a confirmed malignancy.  Patient is to call if she desires further intervention.  She is considering her options.  I " communicated with Dr. Dumont multiple times regarding this patient who is seen as urgent work in.  I personally reviewed her CT imaging and agree with the report which was later resulted.  Final CT scan report was communicated to patient 12/16/2024 in a phone call by me.    Pain assessment was performed today as a part of patient’s care.  For patients with pain related to surgery, gynecologic malignancy or cancer treatment, the plan is as noted in the assessment/plan.  For patients with pain not related to these issues, they are to seek any further needed care from a more appropriate provider, such as PCP.      No orders of the defined types were placed in this encounter.      FOLLOW UP: As needed, patient to call    I spent 40 minutes caring for Petty on this date of service. This time includes time spent by me in the following activities: preparing for the visit, reviewing tests, performing a medically appropriate examination and/or evaluation, counseling and educating the patient/family/caregiver, referring and communicating with other health care professionals, documenting information in the medical record, independently interpreting results and communicating that information with the patient/family/caregiver, and care coordination    Electronically Signed by: Susy Goodwin MD  Date: 12/16/2024

## 2024-12-16 PROBLEM — N94.89 TUBO-OVARIAN MASS: Status: ACTIVE | Noted: 2024-12-16

## 2024-12-16 PROBLEM — R18.0 MALIGNANT ASCITES: Status: ACTIVE | Noted: 2024-12-16

## 2024-12-31 ENCOUNTER — TELEPHONE (OUTPATIENT)
Dept: GYNECOLOGIC ONCOLOGY | Facility: CLINIC | Age: 84
End: 2024-12-31
Payer: MEDICARE

## 2024-12-31 NOTE — TELEPHONE ENCOUNTER
Caller: Petty Artis    Relationship to patient: Self    Best call back number: [phone]    Type of visit: FOLLOW UP    Requested date: AFTER 01/13 EXCEPT FOR 01/15 OR 01/23     Additional notes: PLEASE CALL TO SCHEDULE.

## 2025-01-21 ENCOUNTER — TRANSCRIBE ORDERS (OUTPATIENT)
Dept: LAB | Facility: HOSPITAL | Age: 85
End: 2025-01-21
Payer: MEDICARE

## 2025-01-21 ENCOUNTER — LAB (OUTPATIENT)
Dept: LAB | Facility: HOSPITAL | Age: 85
End: 2025-01-21
Payer: MEDICARE

## 2025-01-21 ENCOUNTER — OFFICE VISIT (OUTPATIENT)
Dept: GYNECOLOGIC ONCOLOGY | Facility: CLINIC | Age: 85
End: 2025-01-21
Payer: MEDICARE

## 2025-01-21 ENCOUNTER — DOCUMENTATION (OUTPATIENT)
Dept: GENETICS | Facility: HOSPITAL | Age: 85
End: 2025-01-21
Payer: MEDICARE

## 2025-01-21 VITALS
RESPIRATION RATE: 18 BRPM | TEMPERATURE: 97.8 F | BODY MASS INDEX: 26.29 KG/M2 | WEIGHT: 130.4 LBS | DIASTOLIC BLOOD PRESSURE: 88 MMHG | HEART RATE: 74 BPM | SYSTOLIC BLOOD PRESSURE: 173 MMHG | OXYGEN SATURATION: 97 % | HEIGHT: 59 IN

## 2025-01-21 DIAGNOSIS — Z80.0 FAMILY HISTORY OF COLON CANCER: ICD-10-CM

## 2025-01-21 DIAGNOSIS — C56.3 MALIGNANT NEOPLASM OF BOTH OVARIES: Primary | ICD-10-CM

## 2025-01-21 DIAGNOSIS — Z13.79 GENETIC TESTING: ICD-10-CM

## 2025-01-21 DIAGNOSIS — Z13.79 GENETIC TESTING: Primary | ICD-10-CM

## 2025-01-21 DIAGNOSIS — Z80.41 FAMILY HISTORY OF OVARIAN CANCER: ICD-10-CM

## 2025-01-21 PROBLEM — C56.9 OVARIAN CANCER: Status: ACTIVE | Noted: 2025-01-21

## 2025-01-21 PROCEDURE — 1160F RVW MEDS BY RX/DR IN RCRD: CPT | Performed by: OBSTETRICS & GYNECOLOGY

## 2025-01-21 PROCEDURE — 1159F MED LIST DOCD IN RCRD: CPT | Performed by: OBSTETRICS & GYNECOLOGY

## 2025-01-21 PROCEDURE — 3079F DIAST BP 80-89 MM HG: CPT | Performed by: OBSTETRICS & GYNECOLOGY

## 2025-01-21 PROCEDURE — 36415 COLL VENOUS BLD VENIPUNCTURE: CPT

## 2025-01-21 PROCEDURE — 1126F AMNT PAIN NOTED NONE PRSNT: CPT | Performed by: OBSTETRICS & GYNECOLOGY

## 2025-01-21 PROCEDURE — 3077F SYST BP >= 140 MM HG: CPT | Performed by: OBSTETRICS & GYNECOLOGY

## 2025-01-21 PROCEDURE — 99215 OFFICE O/P EST HI 40 MIN: CPT | Performed by: OBSTETRICS & GYNECOLOGY

## 2025-01-21 NOTE — PROGRESS NOTES
Petty Artis  [id number]  1940      Reason for visit: Ovarian mass, possible cancer    Consultation:  Patient initially seen at the request of Dr. Lopez Dumont    History of present illness:  The patient is a 84 y.o. year old female who presents today for treatment and evaluation of the above issues.    Patient Nevin presents today after going to [place] for the holidays.  Mother had colon cancer at 44 yo.  Patient has not had genetic testing.    Oncologic History:  Oncology/Hematology History   Ovarian cancer   12/12/2024 Cancer Staged    Staging form: Ovary, Fallopian Tube, And Primary Peritoneal Carcinoma, AJCC 8th Edition  - Clinical stage from 12/12/2024: FIGO Stage II (cT2, cN0, cM0) - Signed by Susy Goodwin MD on 1/21/2025  Staging comments: Patient declines treatment due to her advanced age     1/21/2025 Initial Diagnosis    Ovarian cancer           Past Medical History:   Diagnosis Date    C. difficile colitis     Cataract 2009    Colon polyp 2001    Disease of thyroid gland     GERD (gastroesophageal reflux disease) 2011    Hyperlipidemia     Hypertension     Liver disease 2020    Osteopenia 2021       Past Surgical History:   Procedure Laterality Date    CARDIAC CATHETERIZATION  2011    COLONOSCOPY  2018    KYPHOPLASTY      5/21, 7/21, AND 8/21    THYROIDECTOMY, PARTIAL  07/2010       MEDICATIONS:    Current Outpatient Medications:     amLODIPine (NORVASC) 2.5 MG tablet, Take 1 tablet by mouth Daily., Disp: 90 tablet, Rfl: 3    Cholecalciferol (VITAMIN D3 PO), Take 2,000 Units by mouth Daily., Disp: , Rfl:     Cyanocobalamin (VITAMIN B 12 PO), Take 1 tablet by mouth Daily., Disp: , Rfl:     gabapentin (NEURONTIN) 300 MG capsule, Take 1 capsule by mouth 3 (Three) Times a Day., Disp: 270 capsule, Rfl: 1    ibuprofen (ADVIL,MOTRIN) 200 MG tablet, Take 3 tablets by mouth Every 8 (Eight) Hours As Needed., Disp: , Rfl:     levothyroxine (SYNTHROID, LEVOTHROID) 75 MCG tablet, Take 1 tablet by  mouth Daily., Disp: 90 tablet, Rfl: 3    Melatonin 10 MG capsule, Take  by mouth Every Night., Disp: , Rfl:     omeprazole (priLOSEC) 20 MG capsule, Take 1 capsule by mouth Daily., Disp: 90 capsule, Rfl: 3    pravastatin (PRAVACHOL) 20 MG tablet, Take 1 tablet by mouth Every Night., Disp: 90 tablet, Rfl: 3    Probiotic Product (PROBIOTIC ADVANCED PO), Take  by mouth Daily., Disp: , Rfl:     ursodiol (ACTIGALL) 500 MG tablet, Take 1 tablet by mouth 2 (Two) Times a Day With Meals., Disp: 180 tablet, Rfl: 3     Allergies:  is allergic to cefadroxil, clindamycin/lincomycin, and oxycodone.    Social History:   Social History     Socioeconomic History    Marital status:    Tobacco Use    Smoking status: Never     Passive exposure: Past    Smokeless tobacco: Never   Vaping Use    Vaping status: Never Used   Substance and Sexual Activity    Alcohol use: Not Currently     Comment: rarely    Drug use: No    Sexual activity: Not Currently     Partners: Male       Family History:    Family History   Problem Relation Age of Onset    Colon cancer Mother     Cancer Mother     Heart disease Father     Hypertension Father     Hyperlipidemia Father     Heart disease Brother     Ovarian cancer Maternal Aunt     Colon cancer Maternal Aunt     Uterine cancer Maternal Aunt     Arthritis Maternal Grandmother     Asthma Maternal Grandmother        Health Maintenance:    Health Maintenance   Topic Date Due    Hepatitis B (1 of 3 - Risk 3-dose series) Never done    RSV Vaccine - Adults (1 - 1-dose 75+ series) Never done    ZOSTER VACCINE (2 of 3) 09/09/2017    DXA SCAN  12/03/2023    COVID-19 Vaccine (6 - 2024-25 season) 09/01/2024    TDAP/TD VACCINES (2 - Td or Tdap) 04/24/2025    ANNUAL WELLNESS VISIT  05/20/2025    LIPID PANEL  11/20/2025    BMI FOLLOWUP  11/20/2025    INFLUENZA VACCINE  Completed    Pneumococcal Vaccine 65+  Completed    HEMOGLOBIN A1C  Discontinued    URINE MICROALBUMIN  Discontinued       Review of  "Systems:  Please refer to history of present illness.  Review of systems otherwise negative.  Physical Exam:  Vitals:    01/21/25 1514   BP: 173/88   Pulse: 74   Resp: 18   Temp: 97.8 °F (36.6 °C)   TempSrc: Temporal   SpO2: 97%   Weight: 59.1 kg (130 lb 6.4 oz)   Height: 149.2 cm (58.75\")   PainSc: 0-No pain       Body mass index is 26.56 kg/m².  Wt Readings from Last 3 Encounters:   01/21/25 59.1 kg (130 lb 6.4 oz)   12/13/24 62.7 kg (138 lb 4.8 oz)   11/20/24 60.3 kg (133 lb)     GENERAL: Alert, well-appearing female appearing her stated age who is in no apparent distress.   HEENT: Sclera anicteric. Head normocephalic, atraumatic. Mucus membranes moist.   NECK: Deferred  BREASTS: Deferred  CARDIOVASCULAR: deferred  RESPIRATORY:  normal respiratory effort  BACK:  No CVA tenderness, no vertebral tenderness on palpation  GASTROINTESTINAL:  Abdomen is soft, non-tender, non-distended, no rebound or guarding. large protuberant mass filling most of abdomen.  SKIN:  Warm, dry, well-perfused.  All visible areas intact.  No rashes, lesions, ulcers.  PSYCHIATRIC: AO x3, with appropriate affect, normal thought processes.  NEUROLOGIC: No focal deficits.  Uses cane for ambulation  MUSCULOSKELETAL: Uses cane for ambulation  EXTREMITIES:   No cyanosis, clubbing, symmetric.  LYMPHATICS:  No cervical or inguinal adenopathy noted.     PELVIC exam: External genitalia are agglutinated to the extent that no exam is possible    ECOG PS 1    PROCEDURES:  None    Diagnostic Data:    CT Abdomen Pelvis With Contrast    Result Date: 12/15/2024  1. Cirrhosis with mild splenomegaly and mild perihepatic ascites 2. Large multilocular cystic pelvic mass favored to arise from the left ovary, concerning for ovarian cystic epithelial neoplasm 3. Cholelithiasis 4. Colonic diverticulosis. 5. Moderate hiatal hernia Electronically Signed: Osvaldo Lowe  12/15/2024 2:38 PM EST  Workstation ID: [id number]       Lab Results   Component Value Date    WBC " "3.65 11/20/2024    HGB 13.6 11/20/2024    HCT 40.3 11/20/2024    MCV 94.2 11/20/2024     11/20/2024    NEUTROABS 2.28 11/20/2024    GLUCOSE 125 (H) 11/20/2024    BUN 5 (L) 11/20/2024    CREATININE 0.47 (L) 11/20/2024    EGFRIFNONA >60 07/01/2022    EGFRIFAFRI >60 07/01/2022     (L) 11/20/2024    K 4.0 11/20/2024    CL 97 (L) 11/20/2024    CO2 26.6 11/20/2024    CALCIUM 8.6 11/20/2024    ALBUMIN 3.5 11/20/2024    AST 26 11/20/2024    ALT 10 11/20/2024    BILITOT 0.4 11/20/2024     Lab Results   Component Value Date    TSH 3.180 11/20/2024    TSH 4.010 05/20/2024    TSH 3.210 01/22/2024    TSH 2.580 01/19/2023    TSH 3.110 08/16/2022     No results found for: \"FT4\"  No results found for: \"\"    Assessment & Plan   This is a 84 y.o. woman with large complex adnexal mass concerning for cancer.  Encounter Diagnoses   Name Primary?    Malignant neoplasm of both ovaries Yes    Family history of ovarian cancer     Family history of colon cancer      Patient now more than a month out from her previous visit and CT scan due to her preference.    Reviewed imaging with daughter who presented with patient today.  Findings are consistent with ovarian cancer.  Patient does not want to pursue biopsy although IR drainage could be performed in the future for comfort if needed.  She does not want to pursue laparotomy for removal of mass and does not want to consider chemotherapy as a possibility.She is fairly asymptomatic at this point.  We discussed that if she developed pain Dr. Dumont could prescribe medicine for her.  If she is uncomfortable doing this, she could always call me.  We discussed hospice should she develop significant symptoms and need care more towards the end of life.    She was agreeable to consider genetic testing and is undergoing education.      Pain assessment was performed today as a part of patient’s care.  For patients with pain related to surgery, gynecologic malignancy or cancer " treatment, the plan is as noted in the assessment/plan.  For patients with pain not related to these issues, they are to seek any further needed care from a more appropriate provider, such as PCP.      No orders of the defined types were placed in this encounter.      FOLLOW UP: As needed, patient to call    I spent 61 minutes caring for Petty on this date of service. This time includes time spent by me in the following activities: preparing for the visit, reviewing tests, performing a medically appropriate examination and/or evaluation, counseling and educating the patient/family/caregiver, referring and communicating with other health care professionals, documenting information in the medical record, independently interpreting results and communicating that information with the patient/family/caregiver, care coordination, and ordering test(s)      Electronically Signed by: Susy Goodwin MD  Date: 1/21/2025

## 2025-01-22 NOTE — PROGRESS NOTES
Patient was recently diagnosed with Ovarian Cancer. Due to this diagnosis, the patient meets NCCN criteria for genetic testing. During their GYN ONC appointment today, Dr. Goodwin offered the patient testing through the [facility] Genetic Testing Program. This program allows ovarian cancer patients to bypass an initial pre-test genetic counseling visit and proceed directly into testing. The patient viewed a pre-test educational video, and then a blood sample was collected and sent to [facility]. Results are expected in 2-3 weeks and the genetic test results will be disclosed to the patient by the genetic counseling team. Results will be scanned into Epic, and routed to the patient’s provider.

## 2025-02-20 ENCOUNTER — CLINICAL SUPPORT (OUTPATIENT)
Facility: HOSPITAL | Age: 85
End: 2025-02-20
Payer: MEDICARE

## 2025-02-20 DIAGNOSIS — C56.9 MALIGNANT NEOPLASM OF OVARY, UNSPECIFIED LATERALITY: ICD-10-CM

## 2025-02-20 DIAGNOSIS — Z80.0 FAMILY HISTORY OF COLON CANCER: ICD-10-CM

## 2025-02-20 DIAGNOSIS — Z13.79 GENETIC TESTING: Primary | ICD-10-CM

## 2025-02-20 DIAGNOSIS — Z15.89 MONOALLELIC MUTATION OF MUTYH GENE: ICD-10-CM

## 2025-02-20 DIAGNOSIS — Z80.49 FAMILY HISTORY OF MALIGNANT NEOPLASM OF UTERUS: ICD-10-CM

## 2025-02-20 NOTE — PROGRESS NOTES
Petty Artis, a 84 y.o. female, was scheduled for genetic counseling after having germline genetic testing that identified a single mutation in the MUTYH gene. Genetic counseling was provided via telephone. Ms. Artis was diagnosed with ovarian cancer recently at age 84. She reports a personal history of one colon polyp in her lifetime. She recently underwent comprehensive genetic testing completed via the CancerNext panel through [facility] which analyzes BRCA1/2 and 37 additional genes. The genes on this panel include APC, TELLO, AXIN2, BAP1, BARD1, BMPR1A, BRCA1, BRCA2, BRIP1, CDH1, CDKN2A, CHEK2, EPCAM, FH, FLCN, GREM1, HOXB13, MBD4, MET, MLH1, MSH2, MSH3, MSH6, MUTYH, NF1, NTHL1, PALB2, PMS2, POLD1, POLE, PTEN, RAD51C, RAD51D, SMAD4, STK11, TP53, TSC1, TSC2, and VHL. 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  This was discussed with Ms. Artis by telephone on 2025.  This letter serves as a summary of our discussion of these results.    FAMILY HISTORY:  Mother:  Colon cancer, 42  Mat. Aunt:  Colon cancer,  at 57  Mat. Aunt:  Uterine cancer, late 60s/early 70s  Mat. Aunt:  Ovarian cancer, 72    We do not have medical records confirming the diagnoses in Ms. Artis's family.    GENETIC COUNSELING:  We reviewed the family history information in detail.  Cases of cancer follow three general patterns: sporadic, familial, and hereditary.  While most cancer is sporadic, some cases appear to occur in family clusters.  These cases are said to be familial and account for 10-20% of certain cancer cases.  Familial cases may be due to a combination of shared genes and environmental factors among family members.  In even fewer families (~10%), the cancer is said to be inherited, and the genes responsible for the cancer are known.      Family histories typical of hereditary cancer  syndromes usually include multiple first- and second-degree relatives diagnosed with cancer types that define a syndrome.  These cases tend to be diagnosed at younger-than-expected ages and can be bilateral or multifocal.  The cancer in these families follows an autosomal dominant inheritance pattern, which indicates the likely presence of a mutation in a cancer susceptibility gene.  Children and siblings of an individual believed to carry this mutation have a 50% chance of inheriting that mutation, thereby inheriting the increased risk to develop cancer.  These mutations can be passed down from the maternal or the paternal lineage.    Hereditary ovarian cancer accounts for approximately 15-20% of all cases of ovarian cancer.  A significant proportion of hereditary breast and ovarian cancer can be attributed to mutations in the BRCA1 and BRCA2 genes.  Mutations in these genes confer an increased risk for breast cancer, ovarian cancer, male breast cancer, prostate cancer, and pancreatic cancer.  There are other genes in addition to BRCA1/2 that are known to be associated with an increased risk for ovarian cancer and other cancers.  Genetic testing was completed via a comprehensive multigene panel, allowing evaluation of 39 total genes associated with hereditary risk for cancer.    TEST RESULTS:  Genetic testing was positive for one pathogenic mutation ([id number]) in the MUTYH gene (see attached results).  The presence of one MUTYH mutation indicates that Ms. Artis does NOT have MUTYH-Associated Polyposis (MAP), but is a carrier.  Unlike most hereditary cancer syndromes, MAP is inherited in an autosomal recessive manner; therefore, Ms. Artis being identified as having one MUTYH mutation does not place her at the significantly increased colon cancer risks that are seen when someone has two mutations, one in each copy of the gene. The risk of colon cancer was not found to be significantly increased in large  population-based studies, whereas family-based studies found a somewhat increased risk of colon cancer, ranging from a 5.6-10% lifetime risk in comparison to the general population risk of 3-5%. The most recent NCCN guidelines (v3.2024) state that there is not an increased risk of colon cancer in MUTYH carriers.  Per current NCCN guidelines, general population colorectal cancer screening is appropriate for individuals who have one MUTYH mutation and do not have a family history of colorectal cancer. For individuals with a family history of colorectal cancer, recommendations should be made based on family history. Current NCCN guidelines state that there is insufficient evidence to quote other cancer risks associated with MUTYH.  There was not a hereditary cause for ovarian cancer identified through genetic testing.     Based on Ms. Artis's test results, her siblings and children each have a 50% chance to have inherited the MUTYH mutation identified. The general population carrier frequency is 1-2%; therefore, it is possible for Ms. Artis's family members to have inherited an additional mutation from the other parent. Therefore, rather than just performing single site analysis for the identified mutation, full MUTYH analysis is indicated for family members. Genetic counseling and testing could be considered for her family members.  For individuals found to have two MUTYH mutations, screening colonoscopy begins at age 25.  Genetic testing for adult-onset conditions is not recommended before age 18. We would be happy to see family members who live in the area in our clinic to further discuss this information and testing options. Relatives can request a referral to our office which will prompt our coordinator to contact patients to schedule an appointment.  For family members who live elsewhere, there are genetic counselors at most [facility] centers. They can find a genetic counselor by visiting the National  Society of Genetic Counselors website at www.nsgc.org.      PLAN:  This information was discussed in detail by telephone and a copy of this note is being mailed to Ms. Artis.  She reported that she would pass this information along to her relatives. She is encouraged to contact me with any questions she may have at [phone].       Gloria Pascal MS, [facility] [place]  Licensed Certified Genetic Counselor      Cc: Susy Goodwin MD

## 2025-03-17 ENCOUNTER — TELEPHONE (OUTPATIENT)
Dept: INTERNAL MEDICINE | Facility: CLINIC | Age: 85
End: 2025-03-17
Payer: MEDICARE

## 2025-03-17 NOTE — TELEPHONE ENCOUNTER
"Spoke with patient, states it was sudden onset of abdominal pain \"all over entire abdomen\".  States she has not had pain before and it started after walking back from bathroom from urinating.  States did not hurt to urinate and no blood in urine.  States felt like severe labor pain.  Denies any N/V/D, chills, sweats or any other symptoms.  States took 400mg Ibuprofen about 15-20 minutes after onset and layed down.  States started to improve after about 30 minutes.  States pain is currently about a level 2 now and was a 10 when took Ibuprofen.  States she does still take her Omeprazole.   " Please inform patient of lab results.    1. UTI   Start and Complete full course of antibiotics as prescribed.  C/S results pending   Report any continuing signs such as nausea/vomiting, low back pain, blood in urine, burning with urination after antibiotic completion, or fever.  Drink plenty of water daily. Avoid soda.  Women wipe front to back, urinate after sexual intercourse, and avoid irritating feminine products.  Urinate frequently. Do not hold urine for extended periods of time.    Wear cotton underwear and avoid tight fitting pants.   Use OTC AZO for relief of urinary spasms.      2. Sperm in urine   Could be because of retrograde ejaculation  Will discuss in detail in clinic visit  Thanks,    Dr. Dorantes

## 2025-03-17 NOTE — TELEPHONE ENCOUNTER
"Spoke with patient, informed that Dr Marcos said \"Since her pain has improved significantly, would recommend monitoring it overnight but if pain recurs, she should go to the ED immediately.\"  Verbalized good understanding, agreement and great appreciation.     "

## 2025-03-17 NOTE — TELEPHONE ENCOUNTER
Caller: Artis Petty L    Relationship: Self    Best call back number: [phone]     What medication are you requesting: SOMETHING FOR SYMPTOMS     What are your current symptoms: STOMACH PAIN     How long have you been experiencing symptoms: SINCE 3/17/25     Have you had these symptoms before:    [] Yes  [x] No    Have you been treated for these symptoms before:   [] Yes  [x] No    If a prescription is needed, what is your preferred pharmacy and phone number: [facility] [address] [place] [address] [phone] [facility] [phone]      Additional notes: PATIENT STATES SHE HAS OVARIAN CANCER AND HAS JUST DEVELOPED SOME STOMACH PAIN AND WOULD LIKE TO KNOW IF A MEDICATION CAN BE PRESCRIBED TO HELP.

## 2025-03-17 NOTE — TELEPHONE ENCOUNTER
Please call patient and get more information on her stomach pain, such as location and associated symptoms and anything else that might be important.  Also, is she taking her omeprazole?  Has she tried any other medication?

## 2025-03-17 NOTE — TELEPHONE ENCOUNTER
Noted.  Since her pain has improved significantly, would recommend monitoring it overnight but if pain recurs, she should go to the ED immediately.

## 2025-03-24 ENCOUNTER — TELEPHONE (OUTPATIENT)
Dept: GYNECOLOGIC ONCOLOGY | Facility: CLINIC | Age: 85
End: 2025-03-24
Payer: MEDICARE

## 2025-03-24 NOTE — TELEPHONE ENCOUNTER
"RN received a call from patient who stated that she is having a lot of fluid built up in her abdomen. She stated that she \"looks nine months pregnant.\" RN recommended that the patient come in for an appointment so Dr. Goodwin can assess her ascites and they can discuss draining the fluid or placing a drain. Patient was agreeable to the appointment.  RN transferred patient to  to get an appointment.  "

## 2025-03-26 ENCOUNTER — OFFICE VISIT (OUTPATIENT)
Dept: GYNECOLOGIC ONCOLOGY | Facility: CLINIC | Age: 85
End: 2025-03-26
Payer: MEDICARE

## 2025-03-26 VITALS
HEART RATE: 82 BPM | HEIGHT: 59 IN | DIASTOLIC BLOOD PRESSURE: 74 MMHG | TEMPERATURE: 97.3 F | RESPIRATION RATE: 17 BRPM | SYSTOLIC BLOOD PRESSURE: 159 MMHG | OXYGEN SATURATION: 98 % | BODY MASS INDEX: 29.33 KG/M2 | WEIGHT: 145.5 LBS

## 2025-03-26 DIAGNOSIS — Z71.89 COUNSELING REGARDING END OF LIFE DECISION MAKING: ICD-10-CM

## 2025-03-26 DIAGNOSIS — R14.0 ABDOMINAL DISTENSION: ICD-10-CM

## 2025-03-26 DIAGNOSIS — R60.0 BILATERAL LOWER EXTREMITY EDEMA: ICD-10-CM

## 2025-03-26 DIAGNOSIS — C56.3 MALIGNANT NEOPLASM OF BOTH OVARIES: Primary | ICD-10-CM

## 2025-03-26 DIAGNOSIS — R18.0 MALIGNANT ASCITES: ICD-10-CM

## 2025-03-29 PROBLEM — Z71.89 COUNSELING REGARDING END OF LIFE DECISION MAKING: Status: ACTIVE | Noted: 2025-03-29

## 2025-03-29 PROBLEM — R18.0 MALIGNANT ASCITES: Status: ACTIVE | Noted: 2025-03-29

## 2025-03-29 PROBLEM — R14.0 ABDOMINAL DISTENSION: Status: ACTIVE | Noted: 2025-03-29

## 2025-03-29 PROBLEM — R60.0 BILATERAL LOWER EXTREMITY EDEMA: Status: ACTIVE | Noted: 2025-03-29

## 2025-03-29 NOTE — PROGRESS NOTES
Petty Artis  [id number]  1940      Reason for visit: Ovarian cancer, symptomatic malignant ascites/abdominal distension    Consultation:  Patient initially seen at the request of Dr. Lopez Dumont    History of present illness:  The patient is a 84 y.o. year old female who presents today for treatment and evaluation of the above issues.    Patient presents today with symptomatic abdominal distention.  She is wondering if there is something that can be done to improve this.  She is accompanied by her daughter.  We discussed a variety of things including expectations regarding end-of-life, symptoms of bowel obstruction, symptom management in the setting of abdominal distention, nausea and vomiting, pain and approaches to this in the setting of poor or no p.o. intake.  We discussed paracentesis versus abdominal tunneled catheter placement.  At this point she would like to move towards paracentesis and a hospice referral.  Social situation further complicated by patient currently living independently with daughter living in [place].  No family close.  Discussed genetic testing results.  Complains of bilateral lower extremity edema, symmetric.  Worse during the day, improves through the night when her feet are elevated.  Discussed strategies to mitigate this.  Discussed how this is related to tumor and presumed malignant ascites.    Oncologic History:  Oncology/Hematology History   Ovarian cancer   12/12/2024 Cancer Staged    Staging form: Ovary, Fallopian Tube, And Primary Peritoneal Carcinoma, AJCC 8th Edition  - Clinical stage from 12/12/2024: FIGO Stage II (cT2, cN0, cM0) - Signed by Susy Goodwin MD on 1/21/2025  Staging comments: Patient declines treatment due to her advanced age     1/21/2025 Initial Diagnosis    Ovarian cancer     2/20/2025 Genetic Testing    Patient has genetic testing with the CancerNext panel through [facility] which analyzes BRCA1/2 and 37 additional genes.  Results  revealed patient has the following mutation(s):  Genetic testing was positive for one pathogenic MUTYH gene mutation, meaning that she is a carrier (heterozygous); per NCCN guidelines 2024 stated that there is not increased risk for colon cancer.  No increased screening or risk reduction interventions recommended.           Past Medical History:   Diagnosis Date    C. difficile colitis     Cataract 2009    Colon polyp 2001    Disease of thyroid gland     GERD (gastroesophageal reflux disease) 2011    Hyperlipidemia     Hypertension     Liver disease 2020    Osteopenia 2021       Past Surgical History:   Procedure Laterality Date    CARDIAC CATHETERIZATION  2011    COLONOSCOPY  2018    KYPHOPLASTY      5/21, 7/21, AND 8/21    THYROIDECTOMY, PARTIAL  07/2010       MEDICATIONS:    Current Outpatient Medications:     amLODIPine (NORVASC) 2.5 MG tablet, Take 1 tablet by mouth Daily., Disp: 90 tablet, Rfl: 3    Cholecalciferol (VITAMIN D3 PO), Take 2,000 Units by mouth Daily., Disp: , Rfl:     Cyanocobalamin (VITAMIN B 12 PO), Take 1 tablet by mouth Daily., Disp: , Rfl:     gabapentin (NEURONTIN) 300 MG capsule, Take 1 capsule by mouth 3 (Three) Times a Day., Disp: 270 capsule, Rfl: 1    ibuprofen (ADVIL,MOTRIN) 200 MG tablet, Take 3 tablets by mouth Every 8 (Eight) Hours As Needed., Disp: , Rfl:     levothyroxine (SYNTHROID, LEVOTHROID) 75 MCG tablet, Take 1 tablet by mouth Daily., Disp: 90 tablet, Rfl: 3    Melatonin 10 MG capsule, Take  by mouth Every Night., Disp: , Rfl:     omeprazole (priLOSEC) 20 MG capsule, Take 1 capsule by mouth Daily., Disp: 90 capsule, Rfl: 3    pravastatin (PRAVACHOL) 20 MG tablet, Take 1 tablet by mouth Every Night., Disp: 90 tablet, Rfl: 3    Probiotic Product (PROBIOTIC ADVANCED PO), Take  by mouth Daily., Disp: , Rfl:     ursodiol (ACTIGALL) 500 MG tablet, Take 1 tablet by mouth 2 (Two) Times a Day With Meals., Disp: 180 tablet, Rfl: 3     Allergies:  is allergic to cefadroxil,  "clindamycin/lincomycin, and oxycodone.    Social History:   Social History     Socioeconomic History    Marital status:    Tobacco Use    Smoking status: Never     Passive exposure: Past    Smokeless tobacco: Never   Vaping Use    Vaping status: Never Used   Substance and Sexual Activity    Alcohol use: Not Currently     Comment: rarely    Drug use: No    Sexual activity: Not Currently     Partners: Male       Family History:    Family History   Problem Relation Age of Onset    Colon cancer Mother     Cancer Mother     Heart disease Father     Hypertension Father     Hyperlipidemia Father     Heart disease Brother     Ovarian cancer Maternal Aunt     Colon cancer Maternal Aunt     Uterine cancer Maternal Aunt     Arthritis Maternal Grandmother     Asthma Maternal Grandmother        Health Maintenance:    Health Maintenance   Topic Date Due    Hepatitis B (1 of 3 - Risk 3-dose series) Never done    RSV Vaccine - Adults (1 - 1-dose 75+ series) Never done    ZOSTER VACCINE (2 of 3) 09/09/2017    DXA SCAN  12/03/2023    COVID-19 Vaccine (6 - 2024-25 season) 09/01/2024    ANNUAL WELLNESS VISIT  05/20/2025    TDAP/TD VACCINES (2 - Td or Tdap) 04/24/2025    LIPID PANEL  11/20/2025    INFLUENZA VACCINE  Completed    Pneumococcal Vaccine 50+  Completed    HEMOGLOBIN A1C  Discontinued       Review of Systems:  Please refer to history of present illness.  Review of systems otherwise negative.  Physical Exam:  Vitals:    03/26/25 0850   BP: 159/74   Pulse: 82   Resp: 17   Temp: 97.3 °F (36.3 °C)   TempSrc: Temporal   SpO2: 98%   Weight: 66 kg (145 lb 8 oz)   Height: 149.2 cm (58.74\")   PainSc: 0-No pain       Body mass index is 29.65 kg/m².  Wt Readings from Last 3 Encounters:   03/26/25 66 kg (145 lb 8 oz)   01/21/25 59.1 kg (130 lb 6.4 oz)   12/13/24 62.7 kg (138 lb 4.8 oz)     GENERAL: Alert, well-appearing female appearing her stated age who is in no apparent distress.   HEENT: Sclera anicteric. Head normocephalic, " "atraumatic. Mucus membranes moist.   NECK: Deferred  BREASTS: Deferred  CARDIOVASCULAR: deferred  RESPIRATORY:  normal respiratory effort  BACK:  deferred  GASTROINTESTINAL:  Abdomen distended, no rebound or guarding, nonsurgical abdomen.  Fullness more prominent in lower abdomen.  No distinct mass palpated due to extent of abdominal distention.  SKIN:  Warm, dry, well-perfused.  All visible areas intact.  No rashes, lesions, ulcers.  PSYCHIATRIC: AO x3, with appropriate affect, normal thought processes.  NEUROLOGIC: No focal deficits.  Uses cane for ambulation  MUSCULOSKELETAL: Uses cane for ambulation; 1+ bilateral lower extremity edema.  EXTREMITIES:   No cyanosis, clubbing, symmetric.  LYMPHATICS: Deferred     PELVIC exam: Deferred    ECOG PS 2    PROCEDURES:  None    Diagnostic Data:    No radiology results for the last 90 days.      Lab Results   Component Value Date    WBC 3.65 11/20/2024    HGB 13.6 11/20/2024    HCT 40.3 11/20/2024    MCV 94.2 11/20/2024     11/20/2024    NEUTROABS 2.28 11/20/2024    GLUCOSE 125 (H) 11/20/2024    BUN 5 (L) 11/20/2024    CREATININE 0.47 (L) 11/20/2024    EGFRIFNONA >60 07/01/2022    EGFRIFAFRI >60 07/01/2022     (L) 11/20/2024    K 4.0 11/20/2024    CL 97 (L) 11/20/2024    CO2 26.6 11/20/2024    CALCIUM 8.6 11/20/2024    ALBUMIN 3.5 11/20/2024    AST 26 11/20/2024    ALT 10 11/20/2024    BILITOT 0.4 11/20/2024     Lab Results   Component Value Date    TSH 3.180 11/20/2024    TSH 4.010 05/20/2024    TSH 3.210 01/22/2024    TSH 2.580 01/19/2023    TSH 3.110 08/16/2022     No results found for: \"FT4\"  No results found for: \"\"    Assessment & Plan   This is a 84 y.o. woman with large complex adnexal mass concerning for cancer.  Encounter Diagnoses   Name Primary?    Malignant neoplasm of both ovaries Yes    Counseling regarding end of life decision making     Abdominal distension     Malignant ascites      Paracentesis ordered; unfortunately insufficient fluid " for paracentesis.  Hospice consultation.  End-of-life counseling, see HPI.  Complex social situation discussed with patient and daughter at length.  Symmetric bilateral lower extremity edema discussed.  Safety in the home discussed given patient lives alone, using walker and cane.      Pain assessment was performed today as a part of patient’s care.  For patients with pain related to surgery, gynecologic malignancy or cancer treatment, the plan is as noted in the assessment/plan.  For patients with pain not related to these issues, they are to seek any further needed care from a more appropriate provider, such as PCP.      Orders Placed This Encounter   Procedures    Ambulatory Referral to Home Hospice     Referral Priority:   Routine     Referral Type:   Hospice     Referral Reason:   Specialty Services Required     Requested Specialty:   Hospice Services     Number of Visits Requested:   999       FOLLOW UP: As needed, patient to call    I spent 40 minutes caring for Petty on this date of service. This time includes time spent by me in the following activities: preparing for the visit, reviewing tests, performing a medically appropriate examination and/or evaluation, counseling and educating the patient/family/caregiver, referring and communicating with other health care professionals, documenting information in the medical record, care coordination, and ordering procedure(s)    Electronically Signed by: Susy Goodwin MD  Date: 3/29/2025

## 2025-04-02 ENCOUNTER — TELEPHONE (OUTPATIENT)
Dept: GYNECOLOGIC ONCOLOGY | Facility: CLINIC | Age: 85
End: 2025-04-02
Payer: MEDICARE

## 2025-04-02 NOTE — TELEPHONE ENCOUNTER
"  Caller: Petty Artis \"Petty Artis w\"    Relationship: Self    Best call back number: [phone]    What is the best time to reach you: ANYTIME    Who are you requesting to speak with (clinical staff, provider,  specific staff member): CLINICAL    What was the call regarding: PT IS REQUESTING A CALL BACK FROM JAN IN REGARDS TO HOSPICE      "

## 2025-04-02 NOTE — TELEPHONE ENCOUNTER
RN called returned pt call.  No answer.  LM on SI VM that I was returning her call and to please call me back if she still needed to speak with me.      Patient returned RN call from yesterday, 4/2.  Pt inquiring about Hospice referral.  Has not heard from them.  RN called [facility].  State they have not received referral.  RN faxed necessary paperwork with urgent request.  RN called pt to inform her I had spoken with [facility] and referral was received and to call this office if she has not heard from [facility] by tomorrow.  Pt verbalized understanding and appreciative of call.

## 2025-04-03 ENCOUNTER — TELEPHONE (OUTPATIENT)
Dept: GYNECOLOGIC ONCOLOGY | Facility: CLINIC | Age: 85
End: 2025-04-03
Payer: MEDICARE

## 2025-04-03 ENCOUNTER — TELEPHONE (OUTPATIENT)
Dept: INTERNAL MEDICINE | Facility: CLINIC | Age: 85
End: 2025-04-03
Payer: MEDICARE

## 2025-04-03 NOTE — TELEPHONE ENCOUNTER
Spoke with Libra with Hospice, informed that Dr Dumont said Yes he would follow patient.  Verbalized understanding and great appreciation.

## 2025-04-03 NOTE — TELEPHONE ENCOUNTER
Caller: PAUL    Relationship:     Best call back number: [phone]    What orders are you requesting (i.e. lab or imaging): HOSPICE    In what timeframe would the patient need to come in:     Where will you receive your lab/imaging services: HOME    Additional notes: PT IS NOW ON HOSPICE AND THEY WANT TO MAKE SURE DR WANTS TO STAY ON AS THE DR

## 2025-04-07 ENCOUNTER — TELEPHONE (OUTPATIENT)
Dept: GYNECOLOGIC ONCOLOGY | Facility: CLINIC | Age: 85
End: 2025-04-07

## 2025-04-07 NOTE — TELEPHONE ENCOUNTER
"  Caller: Petty Artis \"Petty Artis w\"    Relationship: Self    Best call back number: [phone]    What was the call regarding: PATIENT TRIED TO GET A PARACENTESIS AT [facility] LAST WEEK AND THEY COULD NOT COMPLETE DUE TO NOT HAVING ENOUGH FLUID TO DRAIN.    CAN SEE GO AHEAD AND CANCEL THE ONE SCHEDULED FOR 4/14/2025?    SHE IS UNDER HOSPICE CARE NOW AND THEY HAVE HER ON A MEDICATION THAT MAY HELP TO REDUCE THE FLUID.    CALL TO ADVISE   "

## 2025-05-16 ENCOUNTER — LAB REQUISITION (OUTPATIENT)
Dept: LAB | Facility: HOSPITAL | Age: 85
End: 2025-05-16
Payer: MEDICARE

## 2025-05-16 DIAGNOSIS — C56.3 MALIGNANT NEOPLASM OF BILATERAL OVARIES: ICD-10-CM

## 2025-05-16 LAB
ALBUMIN SERPL-MCNC: 3.2 G/DL (ref 3.5–5.2)
ALBUMIN/GLOB SERPL: 1 G/DL
ALP SERPL-CCNC: 80 U/L (ref 39–117)
ALT SERPL W P-5'-P-CCNC: 12 U/L (ref 1–33)
ANION GAP SERPL CALCULATED.3IONS-SCNC: 8.9 MMOL/L (ref 5–15)
AST SERPL-CCNC: 26 U/L (ref 1–32)
BILIRUB SERPL-MCNC: 0.7 MG/DL (ref 0–1.2)
BUN SERPL-MCNC: 12 MG/DL (ref 8–23)
BUN/CREAT SERPL: 21.8 (ref 7–25)
CALCIUM SPEC-SCNC: 8.8 MG/DL (ref 8.6–10.5)
CANCER AG125 SERPL QL: 646 U/ML (ref 0–38.1)
CEA SERPL-MCNC: 4.51 NG/ML
CHLORIDE SERPL-SCNC: 94 MMOL/L (ref 98–107)
CO2 SERPL-SCNC: 32.1 MMOL/L (ref 22–29)
CREAT SERPL-MCNC: 0.55 MG/DL (ref 0.57–1)
EGFRCR SERPLBLD CKD-EPI 2021: 90.5 ML/MIN/1.73
GLOBULIN UR ELPH-MCNC: 3.1 GM/DL
GLUCOSE SERPL-MCNC: 127 MG/DL (ref 65–99)
POTASSIUM SERPL-SCNC: 2.8 MMOL/L (ref 3.5–5.2)
PROT SERPL-MCNC: 6.3 G/DL (ref 6–8.5)
SODIUM SERPL-SCNC: 135 MMOL/L (ref 136–145)

## 2025-05-16 PROCEDURE — 82378 CARCINOEMBRYONIC ANTIGEN: CPT | Performed by: INTERNAL MEDICINE

## 2025-05-16 PROCEDURE — 80053 COMPREHEN METABOLIC PANEL: CPT | Performed by: INTERNAL MEDICINE

## 2025-05-16 PROCEDURE — 86304 IMMUNOASSAY TUMOR CA 125: CPT | Performed by: INTERNAL MEDICINE

## 2025-05-29 ENCOUNTER — LAB REQUISITION (OUTPATIENT)
Dept: LAB | Facility: HOSPITAL | Age: 85
End: 2025-05-29
Payer: MEDICARE

## 2025-05-29 DIAGNOSIS — C56.3 MALIGNANT NEOPLASM OF BILATERAL OVARIES: ICD-10-CM

## 2025-05-29 LAB
ALBUMIN SERPL-MCNC: 3.2 G/DL (ref 3.5–5.2)
ALBUMIN/GLOB SERPL: 1.1 G/DL
ALP SERPL-CCNC: 83 U/L (ref 39–117)
ALT SERPL W P-5'-P-CCNC: 13 U/L (ref 1–33)
ANION GAP SERPL CALCULATED.3IONS-SCNC: 10.2 MMOL/L (ref 5–15)
AST SERPL-CCNC: 28 U/L (ref 1–32)
BILIRUB SERPL-MCNC: 0.8 MG/DL (ref 0–1.2)
BUN SERPL-MCNC: 14.4 MG/DL (ref 8–23)
BUN/CREAT SERPL: 24.8 (ref 7–25)
CALCIUM SPEC-SCNC: 8.3 MG/DL (ref 8.6–10.5)
CHLORIDE SERPL-SCNC: 99 MMOL/L (ref 98–107)
CO2 SERPL-SCNC: 27.8 MMOL/L (ref 22–29)
CREAT SERPL-MCNC: 0.58 MG/DL (ref 0.57–1)
EGFRCR SERPLBLD CKD-EPI 2021: 89.4 ML/MIN/1.73
GLOBULIN UR ELPH-MCNC: 2.9 GM/DL
GLUCOSE SERPL-MCNC: 112 MG/DL (ref 65–99)
MAGNESIUM SERPL-MCNC: 1.7 MG/DL (ref 1.6–2.4)
POTASSIUM SERPL-SCNC: 3.9 MMOL/L (ref 3.5–5.2)
PROT SERPL-MCNC: 6.1 G/DL (ref 6–8.5)
SODIUM SERPL-SCNC: 137 MMOL/L (ref 136–145)

## 2025-05-29 PROCEDURE — 83735 ASSAY OF MAGNESIUM: CPT | Performed by: INTERNAL MEDICINE

## 2025-05-29 PROCEDURE — 80053 COMPREHEN METABOLIC PANEL: CPT | Performed by: INTERNAL MEDICINE
